# Patient Record
Sex: FEMALE | Race: BLACK OR AFRICAN AMERICAN | NOT HISPANIC OR LATINO | Employment: OTHER | URBAN - METROPOLITAN AREA
[De-identification: names, ages, dates, MRNs, and addresses within clinical notes are randomized per-mention and may not be internally consistent; named-entity substitution may affect disease eponyms.]

---

## 2023-01-28 PROBLEM — M79.89 SUBCUTANEOUS FAT NECROSIS: Status: ACTIVE | Noted: 2023-01-28

## 2023-01-28 PROBLEM — R10.33 UMBILICAL PAIN: Status: ACTIVE | Noted: 2023-01-28

## 2023-01-28 PROBLEM — R79.89 LOW VITAMIN B12 LEVEL: Status: ACTIVE | Noted: 2023-01-28

## 2023-01-28 PROBLEM — K76.89 HEPATIC CYST: Status: ACTIVE | Noted: 2023-01-28

## 2023-01-28 PROBLEM — I51.9 DIASTOLIC DYSFUNCTION, LEFT VENTRICLE: Status: ACTIVE | Noted: 2023-01-28

## 2023-01-28 PROBLEM — K57.30 DIVERTICULOSIS OF COLON: Status: ACTIVE | Noted: 2023-01-28

## 2023-01-28 PROBLEM — K44.9 HIATAL HERNIA: Status: ACTIVE | Noted: 2023-01-28

## 2023-01-28 PROBLEM — D41.4 BLADDER POLYP: Status: ACTIVE | Noted: 2023-01-28

## 2023-01-28 PROBLEM — K80.20 CHOLELITHIASIS: Status: ACTIVE | Noted: 2023-01-28

## 2023-01-28 PROBLEM — G43.909 MIGRAINE WITHOUT STATUS MIGRAINOSUS, NOT INTRACTABLE: Status: ACTIVE | Noted: 2023-01-28

## 2023-01-28 PROBLEM — D64.9 ANEMIA: Status: ACTIVE | Noted: 2023-01-28

## 2023-01-28 PROBLEM — M17.9 KNEE OSTEOARTHRITIS: Status: ACTIVE | Noted: 2023-01-28

## 2023-01-28 PROBLEM — N20.0 KIDNEY STONES: Status: ACTIVE | Noted: 2023-01-28

## 2023-01-28 PROBLEM — N28.1 ACQUIRED CYST OF KIDNEY: Status: ACTIVE | Noted: 2023-01-28

## 2023-01-28 PROBLEM — K21.9 CHRONIC GERD: Status: ACTIVE | Noted: 2023-01-28

## 2023-01-28 PROBLEM — N18.30 CKD (CHRONIC KIDNEY DISEASE) STAGE 3, GFR 30-59 ML/MIN (MULTI): Status: ACTIVE | Noted: 2023-01-28

## 2023-01-28 PROBLEM — E55.9 VITAMIN D DEFICIENCY: Status: ACTIVE | Noted: 2023-01-28

## 2023-01-28 PROBLEM — I10 HYPERTENSION: Status: ACTIVE | Noted: 2023-01-28

## 2023-01-28 PROBLEM — K76.0 FATTY LIVER: Status: ACTIVE | Noted: 2023-01-28

## 2023-01-28 PROBLEM — L43.9 LICHEN PLANUS: Status: ACTIVE | Noted: 2023-01-28

## 2023-01-28 PROBLEM — K64.0 GRADE I HEMORRHOIDS: Status: ACTIVE | Noted: 2023-01-28

## 2023-01-28 PROBLEM — J32.9 SINUSITIS: Status: ACTIVE | Noted: 2023-01-28

## 2023-01-28 PROBLEM — K59.09 CHRONIC CONSTIPATION: Status: ACTIVE | Noted: 2023-01-28

## 2023-01-28 RX ORDER — FLUTICASONE PROPIONATE 50 MCG
2 SPRAY, SUSPENSION (ML) NASAL DAILY
COMMUNITY
Start: 2020-03-06 | End: 2023-08-31 | Stop reason: SDUPTHER

## 2023-01-28 RX ORDER — FERROUS SULFATE 325(65) MG
65 TABLET, DELAYED RELEASE (ENTERIC COATED) ORAL 3 TIMES WEEKLY
COMMUNITY

## 2023-01-28 RX ORDER — LANOLIN ALCOHOL/MO/W.PET/CERES
100 CREAM (GRAM) TOPICAL 3 TIMES WEEKLY
COMMUNITY
Start: 2019-02-22

## 2023-01-28 RX ORDER — CLINDAMYCIN PHOSPHATE 10 MG/G
GEL TOPICAL AS NEEDED
COMMUNITY
End: 2023-11-02 | Stop reason: SDUPTHER

## 2023-01-28 RX ORDER — ERGOCALCIFEROL 1.25 MG/1
1.25 CAPSULE ORAL
COMMUNITY
Start: 2022-01-06 | End: 2023-08-18

## 2023-01-28 RX ORDER — MONTELUKAST SODIUM 10 MG/1
10 TABLET ORAL DAILY
COMMUNITY
End: 2023-03-13 | Stop reason: ALTCHOICE

## 2023-01-28 RX ORDER — SUCRALFATE 1 G/10ML
10 SUSPENSION ORAL 4 TIMES DAILY
COMMUNITY
Start: 2021-04-26 | End: 2023-09-13 | Stop reason: SDUPTHER

## 2023-01-28 RX ORDER — OMEPRAZOLE 20 MG/1
1 CAPSULE, DELAYED RELEASE ORAL DAILY
COMMUNITY
Start: 2020-09-29 | End: 2023-06-28 | Stop reason: SDUPTHER

## 2023-01-28 RX ORDER — VIT C/E/ZN/COPPR/LUTEIN/ZEAXAN 250MG-90MG
25 CAPSULE ORAL EVERY OTHER DAY
COMMUNITY
Start: 2022-07-19

## 2023-01-28 RX ORDER — ACETAMINOPHEN 500 MG
1000 TABLET ORAL EVERY 8 HOURS PRN
COMMUNITY
Start: 2021-11-29

## 2023-01-28 RX ORDER — ALBUTEROL SULFATE 90 UG/1
AEROSOL, METERED RESPIRATORY (INHALATION) AS NEEDED
COMMUNITY
Start: 2019-02-22 | End: 2023-06-28 | Stop reason: SDUPTHER

## 2023-01-28 RX ORDER — DEXTROMETHORPHAN/PSEUDOEPHED 2.5-7.5/.8
80 DROPS ORAL DAILY
COMMUNITY
Start: 2021-04-26 | End: 2024-04-10 | Stop reason: ALTCHOICE

## 2023-01-28 RX ORDER — CETIRIZINE HYDROCHLORIDE 10 MG/1
10 TABLET ORAL NIGHTLY
COMMUNITY
End: 2023-09-13 | Stop reason: SDUPTHER

## 2023-01-28 RX ORDER — SERTRALINE HYDROCHLORIDE 25 MG/1
25 TABLET, FILM COATED ORAL DAILY
COMMUNITY
End: 2023-03-13 | Stop reason: SDDI

## 2023-01-28 RX ORDER — SUMATRIPTAN 50 MG/1
50 TABLET, FILM COATED ORAL ONCE AS NEEDED
COMMUNITY
End: 2024-04-10 | Stop reason: ALTCHOICE

## 2023-01-28 RX ORDER — TEMAZEPAM 7.5 MG/1
7.5 CAPSULE ORAL NIGHTLY PRN
COMMUNITY
End: 2023-03-13 | Stop reason: SDUPTHER

## 2023-01-28 RX ORDER — CANDESARTAN CILEXETIL AND HYDROCHLOROTHIAZIDE 16; 12.5 MG/1; MG/1
1 TABLET ORAL DAILY
COMMUNITY
Start: 2021-03-14 | End: 2023-12-31

## 2023-03-13 ENCOUNTER — OFFICE VISIT (OUTPATIENT)
Dept: PRIMARY CARE | Facility: CLINIC | Age: 73
End: 2023-03-13
Payer: COMMERCIAL

## 2023-03-13 VITALS
SYSTOLIC BLOOD PRESSURE: 112 MMHG | TEMPERATURE: 97.4 F | DIASTOLIC BLOOD PRESSURE: 73 MMHG | HEART RATE: 76 BPM | WEIGHT: 260 LBS | BODY MASS INDEX: 43.94 KG/M2

## 2023-03-13 DIAGNOSIS — F41.9 ANXIETY: ICD-10-CM

## 2023-03-13 DIAGNOSIS — G47.9 SLEEP DISTURBANCE: Primary | ICD-10-CM

## 2023-03-13 DIAGNOSIS — Z79.899 MEDICATION MANAGEMENT: ICD-10-CM

## 2023-03-13 PROBLEM — Z00.00 ROUTINE ADULT HEALTH MAINTENANCE: Status: ACTIVE | Noted: 2023-03-13

## 2023-03-13 PROCEDURE — 1036F TOBACCO NON-USER: CPT | Performed by: NURSE PRACTITIONER

## 2023-03-13 PROCEDURE — 1159F MED LIST DOCD IN RCRD: CPT | Performed by: NURSE PRACTITIONER

## 2023-03-13 PROCEDURE — 3074F SYST BP LT 130 MM HG: CPT | Performed by: NURSE PRACTITIONER

## 2023-03-13 PROCEDURE — 1160F RVW MEDS BY RX/DR IN RCRD: CPT | Performed by: NURSE PRACTITIONER

## 2023-03-13 PROCEDURE — 99214 OFFICE O/P EST MOD 30 MIN: CPT | Performed by: NURSE PRACTITIONER

## 2023-03-13 PROCEDURE — 3078F DIAST BP <80 MM HG: CPT | Performed by: NURSE PRACTITIONER

## 2023-03-13 RX ORDER — TEMAZEPAM 7.5 MG/1
7.5 CAPSULE ORAL NIGHTLY PRN
Qty: 30 CAPSULE | Refills: 3 | Status: SHIPPED | OUTPATIENT
Start: 2023-03-13 | End: 2023-09-13 | Stop reason: SDUPTHER

## 2023-03-13 ASSESSMENT — ANXIETY QUESTIONNAIRES
7. FEELING AFRAID AS IF SOMETHING AWFUL MIGHT HAPPEN: MORE THAN HALF THE DAYS
GAD7 TOTAL SCORE: 10
5. BEING SO RESTLESS THAT IT IS HARD TO SIT STILL: NOT AT ALL
4. TROUBLE RELAXING: MORE THAN HALF THE DAYS
1. FEELING NERVOUS, ANXIOUS, OR ON EDGE: MORE THAN HALF THE DAYS
IF YOU CHECKED OFF ANY PROBLEMS ON THIS QUESTIONNAIRE, HOW DIFFICULT HAVE THESE PROBLEMS MADE IT FOR YOU TO DO YOUR WORK, TAKE CARE OF THINGS AT HOME, OR GET ALONG WITH OTHER PEOPLE: SOMEWHAT DIFFICULT
3. WORRYING TOO MUCH ABOUT DIFFERENT THINGS: MORE THAN HALF THE DAYS
2. NOT BEING ABLE TO STOP OR CONTROL WORRYING: MORE THAN HALF THE DAYS
6. BECOMING EASILY ANNOYED OR IRRITABLE: NOT AT ALL

## 2023-03-13 ASSESSMENT — PATIENT HEALTH QUESTIONNAIRE - PHQ9
1. LITTLE INTEREST OR PLEASURE IN DOING THINGS: NOT AT ALL
SUM OF ALL RESPONSES TO PHQ9 QUESTIONS 1 AND 2: 0
2. FEELING DOWN, DEPRESSED OR HOPELESS: NOT AT ALL

## 2023-03-13 NOTE — PROGRESS NOTES
Subjective   Patient ID: Janell Rivera is a 72 y.o. female who presents for CSV (CSV/Temazepam/).  HPI  Plans to go to counseling  Still having hard time with loss of daughter and the challenges with her granddaughter living with her  Stopped zoloft, only took twice    Review of Systems  ROS completely negative except what was mentioned in the HPI.  Problem List, surgical, social, and family histories which were reviewed and updated as necessary within the EMR. I also personally reviewed the notes, labs, and imaging that pertained to what was documented or discussed in the HPI.      Objective   Physical Exam  Vitals and nursing note reviewed.   Constitutional:       General: She is not in acute distress.     Appearance: Normal appearance.   HENT:      Head: Normocephalic and atraumatic.      Right Ear: External ear normal.      Left Ear: External ear normal.      Nose: Nose normal.      Mouth/Throat:      Mouth: Mucous membranes are moist.   Eyes:      Extraocular Movements: Extraocular movements intact.      Conjunctiva/sclera: Conjunctivae normal.      Pupils: Pupils are equal, round, and reactive to light.   Cardiovascular:      Rate and Rhythm: Normal rate and regular rhythm.      Heart sounds: Normal heart sounds.   Pulmonary:      Effort: Pulmonary effort is normal.      Breath sounds: Normal breath sounds.   Musculoskeletal:         General: Normal range of motion.      Cervical back: Normal range of motion and neck supple.   Lymphadenopathy:      Cervical: No cervical adenopathy.   Skin:     General: Skin is warm and dry.   Neurological:      Mental Status: She is alert and oriented to person, place, and time. Mental status is at baseline.   Psychiatric:         Mood and Affect: Mood normal.         Behavior: Behavior normal.         Thought Content: Thought content normal.         Judgment: Judgment normal.         /73 (BP Location: Left arm)   Pulse 76   Temp 36.3 °C (97.4 °F) (Oral)   Wt 118 kg  (260 lb)   BMI 43.94 kg/m²     Assessment/Plan   Problem List Items Addressed This Visit    None  Visit Diagnoses       Sleep disturbance    -  Primary    Relevant Medications    temazepam (Restoril) 7.5 mg capsule    Other Relevant Orders    Follow Up In [facility] - PCP    Anxiety        Relevant Medications    temazepam (Restoril) 7.5 mg capsule    Other Relevant Orders    Follow Up In [facility] - PCP          OARRS:  Coni Gaming, APRN-CNP on 3/13/2023  1:53 PM  I have personally reviewed the OARRS report for Janell Miguel. I have considered the risks of abuse, dependence, addiction and diversion    Is the patient prescribed a combination of a benzodiazepine and opioid?  Yes, I feel it is clincially indicated to continue the medication and have discussed with the patient risks/benefits/alternatives.    Last Urine Drug Screen / ordered today: Yes  Recent Results (from the past 32290 hour(s))   Drug Screen, Urine With Reflex to Confirmation    Collection Time: 09/30/22 12:30 PM   Result Value Ref Range    DRUG SCREEN COMMENT URINE SEE BELOW     Amphetamine Screen, Urine PRESUMPTIVE NEGATIVE NEGATIVE    Barbiturate Screen, Urine PRESUMPTIVE NEGATIVE NEGATIVE    BENZODIAZEPINE (PRESENCE) IN URINE BY SCREEN METHOD PRESUMPTIVE NEGATIVE NEGATIVE    Cannabinoid Screen, Urine PRESUMPTIVE NEGATIVE NEGATIVE    Cocaine Screen, Urine PRESUMPTIVE NEGATIVE NEGATIVE    Fentanyl, Ur PRESUMPTIVE NEGATIVE NEGATIVE    Methadone Screen, Urine PRESUMPTIVE NEGATIVE NEGATIVE    Opiate Screen, Urine PRESUMPTIVE NEGATIVE NEGATIVE    Oxycodone Screen, Ur PRESUMPTIVE NEGATIVE NEGATIVE    PCP Screen, Urine PRESUMPTIVE NEGATIVE NEGATIVE   OPIATE/OPIOID/BENZO PRESCRIPTION COMPLIANCE    Collection Time: 06/11/21  9:16 AM   Result Value Ref Range    DRUG SCREEN COMMENT URINE SEE BELOW     Creatine, Urine 97.1 mg/dL    Amphetamine Screen, Urine PRESUMPTIVE NEGATIVE NEGATIVE    Barbiturate Screen, Urine PRESUMPTIVE  NEGATIVE NEGATIVE    Cannabinoid Screen, Urine PRESUMPTIVE NEGATIVE NEGATIVE    Cocaine Screen, Urine PRESUMPTIVE NEGATIVE NEGATIVE    PCP Screen, Urine PRESUMPTIVE NEGATIVE NEGATIVE    7-Aminoclonazepam <25 Cutoff <25 ng/mL    Alpha-Hydroxyalprazolam <25 Cutoff <25 ng/mL    Alpha-Hydroxymidazolam <25 Cutoff <25 ng/mL    Alprazolam <25 Cutoff <25 ng/mL    Chlordiazepoxide <25 Cutoff <25 ng/mL    Clonazepam <25 Cutoff <25 ng/mL    Diazepam <25 Cutoff <25 ng/mL    Lorazepam <25 Cutoff <25 ng/mL    Midazolam <25 Cutoff <25 ng/mL    Nordiazepam <25 Cutoff <25 ng/mL    Oxazepam <25 Cutoff <25 ng/mL    Temazepam <25 Cutoff <25 ng/mL    Zolpidem <25 Cutoff <25 ng/mL    Zolpidem Metabolite (ZCA) <25 Cutoff <25 ng/mL    6-Acetylmorphine <25 Cutoff <25 ng/mL    Codeine <50 Cutoff <50 ng/mL    Hydrocodone <25 Cutoff <25 ng/mL    Hydromorphone <25 Cutoff <25 ng/mL    Morphine Urine <50 Cutoff <50 ng/mL    Norhydrocodone <25 Cutoff <25 ng/mL    Noroxycodone <25 Cutoff <25 ng/mL    Oxycodone <25 Cutoff <25 ng/mL    Oxymorphone <25 Cutoff <25 ng/mL    Tramadol <50 Cutoff <50 ng/mL    O-Desmethyltramadol <50 Cutoff <50 ng/mL    Fentanyl <2.5 Cutoff<2.5 ng/mL    Norfentanyl <2.5 Cutoff<2.5 ng/mL    METHADONE CONFIRMATION,URINE <25 Cutoff <25 ng/mL    EDDP <25 Cutoff <25 ng/mL     Results are as expected.     Controlled Substance Agreement:  Date of the Last Agreement: 9/30/2022  Reviewed Controlled Substance Agreement including but not limited to the benefits, risks, and alternatives to treatment with a Controlled Substance medication(s).    Benzodiazepines:  What is the patient's goal of therapy? For sleep and anxiety  Is this being achieved with current treatment? Yes    MILLA-7 Anxiety Scale - see flowsheet    Activities of Daily Living:   Is your overall impression that this patient is benefiting (symptom reduction outweighs side effects) from benzodiazepine therapy? Yes     1. Physical Functioning: Better  2. Family Relationship:  Better  3. Social Relationship: Better  4. Mood: Better  5. Sleep Patterns: Better  6. Overall Function: Better

## 2023-06-12 ENCOUNTER — APPOINTMENT (OUTPATIENT)
Dept: PRIMARY CARE | Facility: CLINIC | Age: 73
End: 2023-06-12
Payer: COMMERCIAL

## 2023-06-28 ENCOUNTER — OFFICE VISIT (OUTPATIENT)
Dept: PRIMARY CARE | Facility: CLINIC | Age: 73
End: 2023-06-28
Payer: COMMERCIAL

## 2023-06-28 VITALS
WEIGHT: 258 LBS | DIASTOLIC BLOOD PRESSURE: 71 MMHG | OXYGEN SATURATION: 95 % | SYSTOLIC BLOOD PRESSURE: 107 MMHG | HEART RATE: 72 BPM | BODY MASS INDEX: 44.05 KG/M2 | TEMPERATURE: 97.9 F | HEIGHT: 64 IN

## 2023-06-28 DIAGNOSIS — J01.11 ACUTE RECURRENT FRONTAL SINUSITIS: ICD-10-CM

## 2023-06-28 DIAGNOSIS — R06.02 SHORTNESS OF BREATH: ICD-10-CM

## 2023-06-28 DIAGNOSIS — G47.9 SLEEP DISTURBANCE: ICD-10-CM

## 2023-06-28 DIAGNOSIS — F41.9 ANXIETY: ICD-10-CM

## 2023-06-28 DIAGNOSIS — K21.9 CHRONIC GERD: Primary | ICD-10-CM

## 2023-06-28 PROCEDURE — 3078F DIAST BP <80 MM HG: CPT | Performed by: NURSE PRACTITIONER

## 2023-06-28 PROCEDURE — 1159F MED LIST DOCD IN RCRD: CPT | Performed by: NURSE PRACTITIONER

## 2023-06-28 PROCEDURE — 3074F SYST BP LT 130 MM HG: CPT | Performed by: NURSE PRACTITIONER

## 2023-06-28 PROCEDURE — 99213 OFFICE O/P EST LOW 20 MIN: CPT | Performed by: NURSE PRACTITIONER

## 2023-06-28 PROCEDURE — 1036F TOBACCO NON-USER: CPT | Performed by: NURSE PRACTITIONER

## 2023-06-28 PROCEDURE — 1160F RVW MEDS BY RX/DR IN RCRD: CPT | Performed by: NURSE PRACTITIONER

## 2023-06-28 RX ORDER — AZITHROMYCIN 250 MG/1
TABLET, FILM COATED ORAL
Qty: 6 TABLET | Refills: 0 | Status: SHIPPED | OUTPATIENT
Start: 2023-06-28 | End: 2023-07-03

## 2023-06-28 RX ORDER — OMEPRAZOLE 20 MG/1
20 CAPSULE, DELAYED RELEASE ORAL
Qty: 90 CAPSULE | Refills: 3 | Status: SHIPPED | OUTPATIENT
Start: 2023-06-28 | End: 2024-04-01 | Stop reason: WASHOUT

## 2023-06-28 RX ORDER — ALBUTEROL SULFATE 90 UG/1
2 AEROSOL, METERED RESPIRATORY (INHALATION) EVERY 4 HOURS PRN
Qty: 18 G | Refills: 11 | Status: SHIPPED | OUTPATIENT
Start: 2023-06-28

## 2023-06-28 NOTE — PATIENT INSTRUCTIONS
Flonase  Max is one spray, each nostril, up to twice a day  When allergies are bad or you get a cold, can start flonase twice a day for a week, then once a day thereafter until symptoms resolve

## 2023-06-28 NOTE — PROGRESS NOTES
Subjective   Patient ID: Janell Rivera is a 72 y.o. female who presents for Sleep follow up.    CSV  Temazepam  Uses sparingly  For sleep  Tolerates well  No AE/SE    Refill Omeprazole 20mg  And Albuterol inhaler  - sometimes gives her racing HR  - resolves shortly after lying down    Sinus and nasal drainage x 1 month  Used to use flonase too much  Now scared to use  Used it once last night  Now sinuses are draining  Some pressure in sinuses  Prone to sinus infections        Review of Systems  ROS completely negative except what was mentioned in the HPI.  Problem List, surgical, social, and family histories which were reviewed and updated as necessary within the EMR. I also personally reviewed the notes, labs, and imaging that pertained to what was documented or discussed in the HPI.      Objective   Physical Exam  Vitals and nursing note reviewed.   Constitutional:       General: She is not in acute distress.     Appearance: Normal appearance.   HENT:      Head: Normocephalic and atraumatic.      Right Ear: Tympanic membrane, ear canal and external ear normal.      Left Ear: Tympanic membrane, ear canal and external ear normal.      Nose: Nose normal.      Mouth/Throat:      Mouth: Mucous membranes are moist.   Eyes:      Extraocular Movements: Extraocular movements intact.      Conjunctiva/sclera: Conjunctivae normal.      Pupils: Pupils are equal, round, and reactive to light.   Cardiovascular:      Rate and Rhythm: Normal rate and regular rhythm.      Heart sounds: Normal heart sounds.   Pulmonary:      Effort: Pulmonary effort is normal.      Breath sounds: Normal breath sounds.   Musculoskeletal:         General: Normal range of motion.      Cervical back: Normal range of motion and neck supple.   Skin:     General: Skin is warm and dry.   Neurological:      General: No focal deficit present.      Mental Status: She is alert and oriented to person, place, and time. Mental status is at baseline.   Psychiatric:   "       Mood and Affect: Mood normal.         Behavior: Behavior normal.         Thought Content: Thought content normal.         Judgment: Judgment normal.         /71 (BP Location: Left arm)   Pulse 72   Temp 36.6 °C (97.9 °F) (Temporal)   Ht 1.626 m (5' 4\")   Wt 117 kg (258 lb)   SpO2 95%   BMI 44.29 kg/m²     Assessment/Plan   Problem List Items Addressed This Visit       Anxiety     Uses restoril for sleep and anxiety  No AE/SE  Tolerates well  Monitor         Chronic GERD - Primary    Relevant Medications    omeprazole (PriLOSEC) 20 mg DR capsule    Sinusitis     Dicussed proper usage of flonase  Azith if not improving         Relevant Medications    azithromycin (Zithromax) 250 mg tablet    Sleep disturbance     Uses restoril at night prn  To help with anxiety and sleep disturbance  No AE/SE  Monitor          Other Visit Diagnoses       Shortness of breath        Relevant Medications    albuterol 90 mcg/actuation inhaler          OARRS:  Coni Gaming, APRN-CNP on 6/28/2023  3:56 PM  I have personally reviewed the OARRS report for Janell Rivera. I have considered the risks of abuse, dependence, addiction and diversion and I believe that it is clinically appropriate for Janell Rivera to be prescribed this medication    Is the patient prescribed a combination of a benzodiazepine and opioid?  No    Last Urine Drug Screen / ordered today: Yes  Recent Results (from the past 81970 hour(s))   Drug Screen, Urine With Reflex to Confirmation    Collection Time: 09/30/22 12:30 PM   Result Value Ref Range    DRUG SCREEN COMMENT URINE SEE BELOW     Amphetamine Screen, Urine PRESUMPTIVE NEGATIVE NEGATIVE    Barbiturate Screen, Urine PRESUMPTIVE NEGATIVE NEGATIVE    BENZODIAZEPINE (PRESENCE) IN URINE BY SCREEN METHOD PRESUMPTIVE NEGATIVE NEGATIVE    Cannabinoid Screen, Urine PRESUMPTIVE NEGATIVE NEGATIVE    Cocaine Screen, Urine PRESUMPTIVE NEGATIVE NEGATIVE    Fentanyl, Ur PRESUMPTIVE NEGATIVE NEGATIVE    " Methadone Screen, Urine PRESUMPTIVE NEGATIVE NEGATIVE    Opiate Screen, Urine PRESUMPTIVE NEGATIVE NEGATIVE    Oxycodone Screen, Ur PRESUMPTIVE NEGATIVE NEGATIVE    PCP Screen, Urine PRESUMPTIVE NEGATIVE NEGATIVE   OPIATE/OPIOID/BENZO PRESCRIPTION COMPLIANCE    Collection Time: 06/11/21  9:16 AM   Result Value Ref Range    DRUG SCREEN COMMENT URINE SEE BELOW     Creatine, Urine 97.1 mg/dL    Amphetamine Screen, Urine PRESUMPTIVE NEGATIVE NEGATIVE    Barbiturate Screen, Urine PRESUMPTIVE NEGATIVE NEGATIVE    Cannabinoid Screen, Urine PRESUMPTIVE NEGATIVE NEGATIVE    Cocaine Screen, Urine PRESUMPTIVE NEGATIVE NEGATIVE    PCP Screen, Urine PRESUMPTIVE NEGATIVE NEGATIVE    7-Aminoclonazepam <25 Cutoff <25 ng/mL    Alpha-Hydroxyalprazolam <25 Cutoff <25 ng/mL    Alpha-Hydroxymidazolam <25 Cutoff <25 ng/mL    Alprazolam <25 Cutoff <25 ng/mL    Chlordiazepoxide <25 Cutoff <25 ng/mL    Clonazepam <25 Cutoff <25 ng/mL    Diazepam <25 Cutoff <25 ng/mL    Lorazepam <25 Cutoff <25 ng/mL    Midazolam <25 Cutoff <25 ng/mL    Nordiazepam <25 Cutoff <25 ng/mL    Oxazepam <25 Cutoff <25 ng/mL    Temazepam <25 Cutoff <25 ng/mL    Zolpidem <25 Cutoff <25 ng/mL    Zolpidem Metabolite (ZCA) <25 Cutoff <25 ng/mL    6-Acetylmorphine <25 Cutoff <25 ng/mL    Codeine <50 Cutoff <50 ng/mL    Hydrocodone <25 Cutoff <25 ng/mL    Hydromorphone <25 Cutoff <25 ng/mL    Morphine Urine <50 Cutoff <50 ng/mL    Norhydrocodone <25 Cutoff <25 ng/mL    Noroxycodone <25 Cutoff <25 ng/mL    Oxycodone <25 Cutoff <25 ng/mL    Oxymorphone <25 Cutoff <25 ng/mL    Tramadol <50 Cutoff <50 ng/mL    O-Desmethyltramadol <50 Cutoff <50 ng/mL    Fentanyl <2.5 Cutoff<2.5 ng/mL    Norfentanyl <2.5 Cutoff<2.5 ng/mL    METHADONE CONFIRMATION,URINE <25 Cutoff <25 ng/mL    EDDP <25 Cutoff <25 ng/mL     Results are as expected.     Controlled Substance Agreement:  Date of the Last Agreement: 9/30/22  Reviewed Controlled Substance Agreement including but not limited to the  benefits, risks, and alternatives to treatment with a Controlled Substance medication(s).    Benzodiazepines:  What is the patient's goal of therapy? For sleep  Is this being achieved with current treatment? yes      Activities of Daily Living:   Is your overall impression that this patient is benefiting (symptom reduction outweighs side effects) from benzodiazepine therapy? Yes     1. Physical Functioning: Same  2. Family Relationship: Same  3. Social Relationship: Same  4. Mood: Same  5. Sleep Patterns: Better  6. Overall Function: Better

## 2023-07-25 LAB
BASOPHILS (10*3/UL) IN BLOOD BY AUTOMATED COUNT: 0.04 X10E9/L (ref 0–0.1)
BASOPHILS/100 LEUKOCYTES IN BLOOD BY AUTOMATED COUNT: 0.7 % (ref 0–2)
EOSINOPHILS (10*3/UL) IN BLOOD BY AUTOMATED COUNT: 0.27 X10E9/L (ref 0–0.4)
EOSINOPHILS/100 LEUKOCYTES IN BLOOD BY AUTOMATED COUNT: 5.1 % (ref 0–6)
ERYTHROCYTE DISTRIBUTION WIDTH (RATIO) BY AUTOMATED COUNT: 13.1 % (ref 11.5–14.5)
ERYTHROCYTE MEAN CORPUSCULAR HEMOGLOBIN CONCENTRATION (G/DL) BY AUTOMATED: 33 G/DL (ref 32–36)
ERYTHROCYTE MEAN CORPUSCULAR VOLUME (FL) BY AUTOMATED COUNT: 87 FL (ref 80–100)
ERYTHROCYTES (10*6/UL) IN BLOOD BY AUTOMATED COUNT: 4.22 X10E12/L (ref 4–5.2)
HEMATOCRIT (%) IN BLOOD BY AUTOMATED COUNT: 36.7 % (ref 36–46)
HEMOGLOBIN (G/DL) IN BLOOD: 12.1 G/DL (ref 12–16)
IMMATURE GRANULOCYTES/100 LEUKOCYTES IN BLOOD BY AUTOMATED COUNT: 0.2 % (ref 0–0.9)
LEUKOCYTES (10*3/UL) IN BLOOD BY AUTOMATED COUNT: 5.3 X10E9/L (ref 4.4–11.3)
LYMPHOCYTES (10*3/UL) IN BLOOD BY AUTOMATED COUNT: 2.03 X10E9/L (ref 0.8–3)
LYMPHOCYTES/100 LEUKOCYTES IN BLOOD BY AUTOMATED COUNT: 38 % (ref 13–44)
MONOCYTES (10*3/UL) IN BLOOD BY AUTOMATED COUNT: 0.36 X10E9/L (ref 0.05–0.8)
MONOCYTES/100 LEUKOCYTES IN BLOOD BY AUTOMATED COUNT: 6.7 % (ref 2–10)
NEUTROPHILS (10*3/UL) IN BLOOD BY AUTOMATED COUNT: 2.63 X10E9/L (ref 1.6–5.5)
NEUTROPHILS/100 LEUKOCYTES IN BLOOD BY AUTOMATED COUNT: 49.3 % (ref 40–80)
PLATELETS (10*3/UL) IN BLOOD AUTOMATED COUNT: 275 X10E9/L (ref 150–450)

## 2023-07-26 LAB
ANION GAP IN SER/PLAS: 12 MMOL/L (ref 10–20)
CALCIDIOL (25 OH VITAMIN D3) (NG/ML) IN SER/PLAS: 36 NG/ML
CALCIUM (MG/DL) IN SER/PLAS: 9.5 MG/DL (ref 8.6–10.3)
CARBON DIOXIDE, TOTAL (MMOL/L) IN SER/PLAS: 28 MMOL/L (ref 21–32)
CHLORIDE (MMOL/L) IN SER/PLAS: 103 MMOL/L (ref 98–107)
CREATININE (MG/DL) IN SER/PLAS: 1.24 MG/DL (ref 0.5–1.05)
GFR FEMALE: 46 ML/MIN/1.73M2
GLUCOSE (MG/DL) IN SER/PLAS: 99 MG/DL (ref 74–99)
PARATHYRIN INTACT (PG/ML) IN SER/PLAS: 60 PG/ML (ref 18.5–88)
PHOSPHATE (MG/DL) IN SER/PLAS: 3.6 MG/DL (ref 2.5–4.9)
POTASSIUM (MMOL/L) IN SER/PLAS: 3.8 MMOL/L (ref 3.5–5.3)
SODIUM (MMOL/L) IN SER/PLAS: 139 MMOL/L (ref 136–145)
UREA NITROGEN (MG/DL) IN SER/PLAS: 18 MG/DL (ref 6–23)

## 2023-08-18 DIAGNOSIS — E55.9 VITAMIN D DEFICIENCY, UNSPECIFIED: ICD-10-CM

## 2023-08-18 PROBLEM — B02.9 HERPES ZOSTER WITHOUT COMPLICATION: Status: ACTIVE | Noted: 2023-08-18

## 2023-08-18 PROBLEM — E83.41 HYPERMAGNESEMIA: Status: ACTIVE | Noted: 2023-08-18

## 2023-08-18 PROBLEM — Z86.19 HISTORY OF SHINGLES: Status: ACTIVE | Noted: 2023-08-18

## 2023-08-18 PROBLEM — F41.9 ANXIETY AND DEPRESSION: Status: ACTIVE | Noted: 2023-08-18

## 2023-08-18 PROBLEM — S92.501A FRACTURE OF FIFTH TOE, RIGHT, CLOSED, INITIAL ENCOUNTER: Status: ACTIVE | Noted: 2017-09-22

## 2023-08-18 PROBLEM — H52.4 HYPEROPIA OF BOTH EYES WITH ASTIGMATISM AND PRESBYOPIA: Status: ACTIVE | Noted: 2019-08-30

## 2023-08-18 PROBLEM — H25.813 COMBINED FORMS OF AGE-RELATED CATARACT OF BOTH EYES: Status: ACTIVE | Noted: 2019-08-30

## 2023-08-18 PROBLEM — F32.A ANXIETY AND DEPRESSION: Status: ACTIVE | Noted: 2023-08-18

## 2023-08-18 PROBLEM — Z98.890 HISTORY OF COLONOSCOPY: Status: ACTIVE | Noted: 2023-08-18

## 2023-08-18 PROBLEM — F51.04 CHRONIC INSOMNIA: Status: ACTIVE | Noted: 2023-08-18

## 2023-08-18 PROBLEM — E66.09 OBESITY DUE TO EXCESS CALORIES: Status: ACTIVE | Noted: 2017-04-24

## 2023-08-18 PROBLEM — H52.03 HYPEROPIA OF BOTH EYES WITH ASTIGMATISM AND PRESBYOPIA: Status: ACTIVE | Noted: 2019-08-30

## 2023-08-18 PROBLEM — H10.9 CONJUNCTIVITIS: Status: ACTIVE | Noted: 2019-08-30

## 2023-08-18 PROBLEM — H52.203 HYPEROPIA OF BOTH EYES WITH ASTIGMATISM AND PRESBYOPIA: Status: ACTIVE | Noted: 2019-08-30

## 2023-08-18 PROBLEM — R73.01 IFG (IMPAIRED FASTING GLUCOSE): Status: ACTIVE | Noted: 2023-08-18

## 2023-08-18 PROBLEM — N28.9 IMPAIRED RENAL FUNCTION: Status: ACTIVE | Noted: 2023-08-18

## 2023-08-18 PROBLEM — M79.671 PAIN IN RIGHT FOOT: Status: ACTIVE | Noted: 2017-09-22

## 2023-08-18 PROBLEM — L30.9 ECZEMA: Status: ACTIVE | Noted: 2023-08-18

## 2023-08-18 RX ORDER — ERGOCALCIFEROL 1.25 MG/1
CAPSULE ORAL
Qty: 12 CAPSULE | Refills: 3 | Status: SHIPPED | OUTPATIENT
Start: 2023-08-18 | End: 2024-04-01 | Stop reason: WASHOUT

## 2023-08-31 ENCOUNTER — OFFICE VISIT (OUTPATIENT)
Dept: PRIMARY CARE | Facility: CLINIC | Age: 73
End: 2023-08-31
Payer: COMMERCIAL

## 2023-08-31 VITALS
TEMPERATURE: 97.9 F | HEART RATE: 77 BPM | HEIGHT: 64 IN | SYSTOLIC BLOOD PRESSURE: 92 MMHG | BODY MASS INDEX: 44.05 KG/M2 | OXYGEN SATURATION: 98 % | DIASTOLIC BLOOD PRESSURE: 63 MMHG | WEIGHT: 258 LBS

## 2023-08-31 DIAGNOSIS — H66.91 ACUTE OTITIS MEDIA, RIGHT: Primary | ICD-10-CM

## 2023-08-31 LAB — POC RAPID STREP: NEGATIVE

## 2023-08-31 PROCEDURE — 99213 OFFICE O/P EST LOW 20 MIN: CPT | Performed by: NURSE PRACTITIONER

## 2023-08-31 PROCEDURE — 1036F TOBACCO NON-USER: CPT | Performed by: NURSE PRACTITIONER

## 2023-08-31 PROCEDURE — 1159F MED LIST DOCD IN RCRD: CPT | Performed by: NURSE PRACTITIONER

## 2023-08-31 PROCEDURE — 3078F DIAST BP <80 MM HG: CPT | Performed by: NURSE PRACTITIONER

## 2023-08-31 PROCEDURE — 87880 STREP A ASSAY W/OPTIC: CPT | Performed by: NURSE PRACTITIONER

## 2023-08-31 PROCEDURE — 87635 SARS-COV-2 COVID-19 AMP PRB: CPT

## 2023-08-31 PROCEDURE — 3074F SYST BP LT 130 MM HG: CPT | Performed by: NURSE PRACTITIONER

## 2023-08-31 PROCEDURE — 1160F RVW MEDS BY RX/DR IN RCRD: CPT | Performed by: NURSE PRACTITIONER

## 2023-08-31 RX ORDER — AZITHROMYCIN 250 MG/1
TABLET, FILM COATED ORAL
Qty: 6 TABLET | Refills: 0 | Status: SHIPPED | OUTPATIENT
Start: 2023-08-31 | End: 2023-09-01 | Stop reason: SDUPTHER

## 2023-08-31 RX ORDER — FLUTICASONE PROPIONATE 50 MCG
2 SPRAY, SUSPENSION (ML) NASAL DAILY
Qty: 16 G | Refills: 3 | Status: SHIPPED | OUTPATIENT
Start: 2023-08-31 | End: 2023-09-24

## 2023-08-31 ASSESSMENT — PATIENT HEALTH QUESTIONNAIRE - PHQ9
2. FEELING DOWN, DEPRESSED OR HOPELESS: NOT AT ALL
1. LITTLE INTEREST OR PLEASURE IN DOING THINGS: NOT AT ALL
SUM OF ALL RESPONSES TO PHQ9 QUESTIONS 1 AND 2: 0

## 2023-08-31 NOTE — PROGRESS NOTES
"Problem List Items Addressed This Visit    None  Visit Diagnoses       Acute otitis media, right    -  Primary    IO strep neg  covid pcr sent (sx x week now)  R OM azith (good response previous)  fu prn    Relevant Medications    fluticasone (Flonase) 50 mcg/actuation nasal spray    azithromycin (Zithromax) 250 mg tablet    Other Relevant Orders    Sars-CoV-2 PCR, Symptomatic    POCT Rapid Strep A manually resulted (Completed)             Subjective   Patient ID: Janell Rivera is a 72 y.o. 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      BP Readings from Last 3 Encounters:   08/31/23 92/63   06/28/23 107/71   03/13/23 112/73      Wt Readings from Last 3 Encounters:   08/31/23 117 kg (258 lb)   06/28/23 117 kg (258 lb)   03/13/23 118 kg (260 lb)      BMI:   Estimated body mass index is 44.29 kg/m² as calculated from the following:    Height as of this encounter: 1.626 m (5' 4\").    Weight as of this encounter: 117 kg (258 lb).    Objective   Physical Exam  Constitutional:       General: She is not in acute distress.  HENT:      Head: Normocephalic and atraumatic.      Right Ear: External ear normal.      Left Ear: Tympanic membrane and external ear normal.      Ears:      Comments: R OM      Nose: Nose normal.      Mouth/Throat:      Mouth: Mucous membranes are moist.   Eyes:      Extraocular Movements: Extraocular movements intact.      Conjunctiva/sclera: Conjunctivae normal.   Cardiovascular:      Rate and Rhythm: Normal rate and regular rhythm.      Pulses: Normal pulses.   Pulmonary:      Effort: Pulmonary effort is " normal.      Breath sounds: Normal breath sounds.   Musculoskeletal:         General: Normal range of motion.      Cervical back: Normal range of motion and neck supple.   Skin:     General: Skin is warm and dry.   Neurological:      General: No focal deficit present.      Mental Status: She is alert.   Psychiatric:         Mood and Affect: Mood normal.

## 2023-08-31 NOTE — LETTER
Janell Rivera  1950  Juror Number: [id number]    8/31/2023    To Whom This May Concern:    My patient, Janell Rivera, is unable to perform Jury Duty due to a mental or physical condition that renders the prospective juror unfit for jury service for the remainder of the jury year.    Should you have any questions please do not hesitate to call.    Thank you for your cooperation.    Sincerely,    Hellen Claros, JAKOB-CNP

## 2023-09-01 DIAGNOSIS — H66.91 ACUTE OTITIS MEDIA, RIGHT: ICD-10-CM

## 2023-09-01 LAB — SARS-COV-2 RESULT: NOT DETECTED

## 2023-09-01 RX ORDER — AZITHROMYCIN 250 MG/1
TABLET, FILM COATED ORAL
Qty: 6 TABLET | Refills: 0 | Status: SHIPPED | OUTPATIENT
Start: 2023-09-01 | End: 2023-09-13 | Stop reason: ALTCHOICE

## 2023-09-01 RX ORDER — AZITHROMYCIN 250 MG/1
TABLET, FILM COATED ORAL
Qty: 6 TABLET | Refills: 0 | Status: SHIPPED | OUTPATIENT
Start: 2023-09-01 | End: 2023-09-01 | Stop reason: ALTCHOICE

## 2023-09-13 ENCOUNTER — OFFICE VISIT (OUTPATIENT)
Dept: PRIMARY CARE | Facility: CLINIC | Age: 73
End: 2023-09-13
Payer: COMMERCIAL

## 2023-09-13 VITALS
BODY MASS INDEX: 44.56 KG/M2 | TEMPERATURE: 97.8 F | HEART RATE: 74 BPM | WEIGHT: 259.6 LBS | DIASTOLIC BLOOD PRESSURE: 71 MMHG | SYSTOLIC BLOOD PRESSURE: 108 MMHG | OXYGEN SATURATION: 96 %

## 2023-09-13 DIAGNOSIS — K21.9 CHRONIC GERD: ICD-10-CM

## 2023-09-13 DIAGNOSIS — J30.2 SEASONAL ALLERGIES: ICD-10-CM

## 2023-09-13 DIAGNOSIS — G47.9 SLEEP DISTURBANCE: ICD-10-CM

## 2023-09-13 DIAGNOSIS — H66.91 ACUTE OTITIS MEDIA, RIGHT: Primary | ICD-10-CM

## 2023-09-13 DIAGNOSIS — F41.9 ANXIETY: ICD-10-CM

## 2023-09-13 PROCEDURE — 1160F RVW MEDS BY RX/DR IN RCRD: CPT | Performed by: NURSE PRACTITIONER

## 2023-09-13 PROCEDURE — 1159F MED LIST DOCD IN RCRD: CPT | Performed by: NURSE PRACTITIONER

## 2023-09-13 PROCEDURE — 3074F SYST BP LT 130 MM HG: CPT | Performed by: NURSE PRACTITIONER

## 2023-09-13 PROCEDURE — 1036F TOBACCO NON-USER: CPT | Performed by: NURSE PRACTITIONER

## 2023-09-13 PROCEDURE — 99213 OFFICE O/P EST LOW 20 MIN: CPT | Performed by: NURSE PRACTITIONER

## 2023-09-13 PROCEDURE — 3078F DIAST BP <80 MM HG: CPT | Performed by: NURSE PRACTITIONER

## 2023-09-13 RX ORDER — CETIRIZINE HYDROCHLORIDE 10 MG/1
10 TABLET ORAL NIGHTLY
Qty: 30 TABLET | Refills: 3 | Status: SHIPPED | OUTPATIENT
Start: 2023-09-13 | End: 2024-01-09

## 2023-09-13 RX ORDER — SUCRALFATE 1 G/10ML
1 SUSPENSION ORAL 2 TIMES DAILY
Qty: 420 ML | Refills: 1 | Status: SHIPPED | OUTPATIENT
Start: 2023-09-13 | End: 2023-10-02

## 2023-09-13 RX ORDER — TEMAZEPAM 7.5 MG/1
7.5 CAPSULE ORAL NIGHTLY PRN
Qty: 30 CAPSULE | Refills: 0 | Status: SHIPPED | OUTPATIENT
Start: 2023-09-13 | End: 2023-10-02

## 2023-09-13 RX ORDER — SUCRALFATE 1 G/10ML
1 SUSPENSION ORAL 2 TIMES DAILY
Qty: 414 ML | Refills: 1 | Status: SHIPPED | OUTPATIENT
Start: 2023-09-13 | End: 2023-09-13

## 2023-09-13 ASSESSMENT — PATIENT HEALTH QUESTIONNAIRE - PHQ9
10. IF YOU CHECKED OFF ANY PROBLEMS, HOW DIFFICULT HAVE THESE PROBLEMS MADE IT FOR YOU TO DO YOUR WORK, TAKE CARE OF THINGS AT HOME, OR GET ALONG WITH OTHER PEOPLE: NOT DIFFICULT AT ALL
2. FEELING DOWN, DEPRESSED OR HOPELESS: SEVERAL DAYS
1. LITTLE INTEREST OR PLEASURE IN DOING THINGS: SEVERAL DAYS
SUM OF ALL RESPONSES TO PHQ9 QUESTIONS 1 AND 2: 2

## 2023-09-13 NOTE — PROGRESS NOTES
"Problem List Items Addressed This Visit       Anxiety    Overview     Uses restoril for sleep and anxiety  No AE/SE  Tolerates well  Monitor         Relevant Medications    temazepam (Restoril) 7.5 mg capsule    Chronic GERD    Overview     Uses carafate prn          Relevant Medications    sucralfate (Carafate) 100 mg/mL suspension    Seasonal allergies    Overview     Uses zyrtec and flonase prn          Relevant Medications    cetirizine (ZyrTEC) 10 mg tablet    Sleep disturbance    Overview     Uses restoril at night prn  To help with anxiety and sleep disturbance  No AE/SE  Monitor         Relevant Medications    temazepam (Restoril) 7.5 mg capsule     Other Visit Diagnoses       Acute otitis media, right    -  Primary    resolved with azith  fu prn             Subjective   Patient ID: Janell Rivera is a 72 y.o. female who presents for Follow-up (Follow up appt for right ear to make sure it is clear).  HPI  I saw her 8/31/23   OM  Azith   improved    Review of Systems   All other systems reviewed and are negative.      BP Readings from Last 3 Encounters:   09/13/23 108/71   08/31/23 92/63   06/28/23 107/71      Wt Readings from Last 3 Encounters:   09/13/23 118 kg (259 lb 9.6 oz)   08/31/23 117 kg (258 lb)   06/28/23 117 kg (258 lb)      BMI:   Estimated body mass index is 44.56 kg/m² as calculated from the following:    Height as of 8/31/23: 1.626 m (5' 4\").    Weight as of this encounter: 118 kg (259 lb 9.6 oz).    Objective   Physical Exam  Constitutional:       Appearance: Normal appearance.   HENT:      Head: Normocephalic and atraumatic.      Right Ear: Tympanic membrane, ear canal and external ear normal.      Left Ear: Tympanic membrane, ear canal and external ear normal.      Nose: Nose normal.      Mouth/Throat:      Mouth: Mucous membranes are moist.      Pharynx: Oropharynx is clear.   Eyes:      Conjunctiva/sclera: Conjunctivae normal.   Pulmonary:      Effort: Pulmonary effort is normal. "   Neurological:      General: No focal deficit present.      Mental Status: She is alert.   Psychiatric:         Mood and Affect: Mood normal.

## 2023-09-18 ENCOUNTER — TELEPHONE (OUTPATIENT)
Dept: PRIMARY CARE | Facility: CLINIC | Age: 73
End: 2023-09-18
Payer: COMMERCIAL

## 2023-09-18 NOTE — TELEPHONE ENCOUNTER
Spoke with pt let her know insurance denied [place] for her Temazepam 7.5 mg  Let her know cost of med if you use good rx is 38 40 dollars at [facility] or [facility]  Pt states she sees NP soon will think about it and let us know which pharmacy to send it to

## 2023-09-20 PROBLEM — Z71.89 ADVANCE DIRECTIVE DISCUSSED WITH PATIENT: Status: ACTIVE | Noted: 2023-09-20

## 2023-09-20 PROBLEM — Z71.89 CARDIAC RISK COUNSELING: Status: ACTIVE | Noted: 2023-09-20

## 2023-09-20 PROBLEM — Z13.89 SCREENING FOR MULTIPLE CONDITIONS: Status: ACTIVE | Noted: 2023-09-20

## 2023-09-20 PROBLEM — R93.1 ABNORMAL SCREENING CARDIAC CT: Status: ACTIVE | Noted: 2023-09-20

## 2023-09-20 PROBLEM — Z78.0 MENOPAUSE: Status: ACTIVE | Noted: 2023-09-20

## 2023-09-23 DIAGNOSIS — H66.91 ACUTE OTITIS MEDIA, RIGHT: ICD-10-CM

## 2023-09-24 RX ORDER — FLUTICASONE PROPIONATE 50 MCG
2 SPRAY, SUSPENSION (ML) NASAL DAILY
Qty: 16 ML | Refills: 3 | Status: SHIPPED | OUTPATIENT
Start: 2023-09-24 | End: 2023-12-28

## 2023-10-02 ENCOUNTER — OFFICE VISIT (OUTPATIENT)
Dept: PRIMARY CARE | Facility: CLINIC | Age: 73
End: 2023-10-02
Payer: COMMERCIAL

## 2023-10-02 VITALS
OXYGEN SATURATION: 96 % | HEART RATE: 87 BPM | TEMPERATURE: 97.7 F | WEIGHT: 257 LBS | HEIGHT: 64 IN | BODY MASS INDEX: 43.87 KG/M2 | SYSTOLIC BLOOD PRESSURE: 119 MMHG | DIASTOLIC BLOOD PRESSURE: 77 MMHG

## 2023-10-02 DIAGNOSIS — Z51.81 ENCOUNTER FOR THERAPEUTIC DRUG LEVEL MONITORING: ICD-10-CM

## 2023-10-02 DIAGNOSIS — N18.31 STAGE 3A CHRONIC KIDNEY DISEASE (MULTI): ICD-10-CM

## 2023-10-02 DIAGNOSIS — Z00.00 ROUTINE ADULT HEALTH MAINTENANCE: ICD-10-CM

## 2023-10-02 DIAGNOSIS — S51.011A SKIN TEAR OF RIGHT ELBOW WITHOUT COMPLICATION, INITIAL ENCOUNTER: ICD-10-CM

## 2023-10-02 DIAGNOSIS — G47.9 SLEEP DISTURBANCE: ICD-10-CM

## 2023-10-02 DIAGNOSIS — J01.11 ACUTE RECURRENT FRONTAL SINUSITIS: ICD-10-CM

## 2023-10-02 DIAGNOSIS — K21.9 CHRONIC GERD: ICD-10-CM

## 2023-10-02 DIAGNOSIS — F41.9 ANXIETY: Primary | ICD-10-CM

## 2023-10-02 DIAGNOSIS — H60.501 ACUTE OTITIS EXTERNA OF RIGHT EAR, UNSPECIFIED TYPE: ICD-10-CM

## 2023-10-02 DIAGNOSIS — F32.1 MAJOR DEPRESSIVE DISORDER, SINGLE EPISODE, MODERATE (MULTI): ICD-10-CM

## 2023-10-02 PROCEDURE — 1159F MED LIST DOCD IN RCRD: CPT | Performed by: NURSE PRACTITIONER

## 2023-10-02 PROCEDURE — 99214 OFFICE O/P EST MOD 30 MIN: CPT | Performed by: NURSE PRACTITIONER

## 2023-10-02 PROCEDURE — 1036F TOBACCO NON-USER: CPT | Performed by: NURSE PRACTITIONER

## 2023-10-02 PROCEDURE — 1160F RVW MEDS BY RX/DR IN RCRD: CPT | Performed by: NURSE PRACTITIONER

## 2023-10-02 PROCEDURE — 3078F DIAST BP <80 MM HG: CPT | Performed by: NURSE PRACTITIONER

## 2023-10-02 PROCEDURE — 3074F SYST BP LT 130 MM HG: CPT | Performed by: NURSE PRACTITIONER

## 2023-10-02 RX ORDER — NEOMYCIN SULFATE, POLYMYXIN B SULFATE, HYDROCORTISONE 3.5; 10000; 1 MG/ML; [USP'U]/ML; MG/ML
SOLUTION/ DROPS AURICULAR (OTIC)
Qty: 10 ML | Refills: 1 | Status: SHIPPED | OUTPATIENT
Start: 2023-10-02 | End: 2024-01-02 | Stop reason: WASHOUT

## 2023-10-02 RX ORDER — SILVER SULFADIAZINE 10 G/1000G
CREAM TOPICAL
Qty: 20 G | Refills: 0 | Status: SHIPPED | OUTPATIENT
Start: 2023-10-02 | End: 2023-12-01

## 2023-10-02 RX ORDER — AZITHROMYCIN 250 MG/1
TABLET, FILM COATED ORAL
Qty: 6 TABLET | Refills: 0 | Status: SHIPPED | OUTPATIENT
Start: 2023-10-02 | End: 2023-10-07

## 2023-10-02 RX ORDER — ALPRAZOLAM 0.25 MG/1
0.25 TABLET ORAL NIGHTLY PRN
Qty: 30 TABLET | Refills: 0 | Status: SHIPPED | OUTPATIENT
Start: 2023-10-02 | End: 2024-01-09 | Stop reason: WASHOUT

## 2023-10-02 RX ORDER — SUCRALFATE 1 G/1
1 TABLET ORAL
Qty: 120 TABLET | Refills: 11 | Status: SHIPPED | OUTPATIENT
Start: 2023-10-02 | End: 2023-10-24

## 2023-10-02 ASSESSMENT — ANXIETY QUESTIONNAIRES
2. NOT BEING ABLE TO STOP OR CONTROL WORRYING: MORE THAN HALF THE DAYS
IF YOU CHECKED OFF ANY PROBLEMS ON THIS QUESTIONNAIRE, HOW DIFFICULT HAVE THESE PROBLEMS MADE IT FOR YOU TO DO YOUR WORK, TAKE CARE OF THINGS AT HOME, OR GET ALONG WITH OTHER PEOPLE: SOMEWHAT DIFFICULT
1. FEELING NERVOUS, ANXIOUS, OR ON EDGE: SEVERAL DAYS
GAD7 TOTAL SCORE: 9
3. WORRYING TOO MUCH ABOUT DIFFERENT THINGS: NEARLY EVERY DAY
7. FEELING AFRAID AS IF SOMETHING AWFUL MIGHT HAPPEN: NEARLY EVERY DAY
6. BECOMING EASILY ANNOYED OR IRRITABLE: NOT AT ALL
4. TROUBLE RELAXING: NOT AT ALL
5. BEING SO RESTLESS THAT IT IS HARD TO SIT STILL: NOT AT ALL

## 2023-10-02 NOTE — PROGRESS NOTES
Subjective   Patient ID: Janell Rivera is a 72 y.o. female who presents for CSV (Temazpam/Recent fall x 1 week right elbow area/).    Insurance wont cover temazepam anymore  Lorazepam caused HA  Tolerates well  Uses sparingly  For anxiety and sleep    Insurance wont cover liquid carafate  Needs pill form    Feel coming out of a house  Dimly lit area  Thought there was a step down  Fell and scraped elbow  Mild soreness    Soreness in right ear  On movement        Review of Systems  ROS completely negative except what was mentioned in the HPI.  Problem List, surgical, social, and family histories which were reviewed and updated as necessary within the EMR. I also personally reviewed the notes, labs, and imaging that pertained to what was documented or discussed in the HPI.    Objective   Physical Exam  Vitals and nursing note reviewed.   Constitutional:       General: She is not in acute distress.     Appearance: Normal appearance.   HENT:      Head: Normocephalic and atraumatic.      Right Ear: External ear normal.      Left Ear: External ear normal.      Nose: Nose normal.      Mouth/Throat:      Mouth: Mucous membranes are moist.   Eyes:      Extraocular Movements: Extraocular movements intact.      Conjunctiva/sclera: Conjunctivae normal.      Pupils: Pupils are equal, round, and reactive to light.   Cardiovascular:      Rate and Rhythm: Normal rate and regular rhythm.      Heart sounds: Normal heart sounds.   Pulmonary:      Effort: Pulmonary effort is normal.      Breath sounds: Normal breath sounds.   Musculoskeletal:         General: Normal range of motion.      Cervical back: Normal range of motion and neck supple.   Skin:     General: Skin is warm and dry.      Comments: 2 cm skin tear on outer right elbow, no drainage or swelling,    Neurological:      General: No focal deficit present.      Mental Status: She is alert and oriented to person, place, and time. Mental status is at baseline.   Psychiatric:     "     Mood and Affect: Mood normal.         Behavior: Behavior normal.         Thought Content: Thought content normal.         Judgment: Judgment normal.         /77 (BP Location: Left arm)   Pulse 87   Temp 36.5 °C (97.7 °F) (Temporal)   Ht 1.626 m (5' 4\")   Wt 117 kg (257 lb)   SpO2 96%   BMI 44.11 kg/m²     Assessment/Plan    Problem List Items Addressed This Visit       Anxiety - Primary    Overview     Used restoril for sleep and anxiety  Insurance will no long cover  Xanax started 10/2/23         Current Assessment & Plan     Restoril not covered by insurance  Lorazepam          Relevant Medications    ALPRAZolam (Xanax) 0.25 mg tablet    Other Relevant Orders    Opiate/Opioid/Benzo Extended Prescription Compliance    Chronic GERD    Overview     Uses carafate prn          Relevant Medications    sucralfate (Carafate) 1 gram tablet    CKD (chronic kidney disease) stage 3, GFR 30-59 ml/min (CMS/HCC)    Overview     On ARB  Stable since 2019  Dr. Pope q6m  3/22 Cr 1.31, GFR 44  7/23 Cr 1.24, GFR 46         Current Assessment & Plan     Most recent BMP in July  Stable, avoid nephrotoxic medications         Major depressive disorder, single episode, moderate (CMS/HCC)    Current Assessment & Plan     Feels improved  Still grieving loss of daughter  Has friends to talk to, Taoist  No medications at this time, stable         Routine adult health maintenance    Overview       Moderna COVID 19 vaccine 3/8/21, 4/5/21, 11/5/21      Influenza Seasonal Inj Age 3+10/10/2018, 9/30/22       Pneumococcal-13 Vac Conjugate1/5/2017       Sontapofu68/6/2015, 9/29/20       TD Adult11/1/2005       Tdap (Age 7+)8/20/2012, 12/13/22      Cscope - 9/21 (repeat 6 mo); 2/5/22 (4yrs)      Mammogram 11/18, 12/3/19, 2/12/21, 2/16/22; 3/23      BMD 12/19; 2/16/22      s/p TAHBSO      Hep C Ab 10/6/15 (-)      4/22 CT abd: No AAA      12/13/22: Current 10-Year ASCVD Risk: 8.41% - Intermediate Risk         Sleep disturbance    " Overview     Used restoril for sleep and anxiety  Insurance will no long cover  Xanax started 10/2/23         Relevant Medications    ALPRAZolam (Xanax) 0.25 mg tablet    Other Relevant Orders    Opiate/Opioid/Benzo Extended Prescription Compliance     Other Visit Diagnoses       Encounter for therapeutic drug level monitoring        Relevant Orders    Opiate/Opioid/Benzo Extended Prescription Compliance    Skin tear of right elbow without complication, initial encounter        Relevant Medications    silver sulfADIAZINE (Silvadene) 1 % cream    Acute recurrent frontal sinusitis        Relevant Medications    azithromycin (Zithromax) 250 mg tablet    Acute otitis externa of right ear, unspecified type        Relevant Medications    neomycin-polymyxin-HC (Cortisporin) otic solution           OARRS:  Coni Gaming, APRN-CNP on 10/2/2023  1:38 PM  I have personally reviewed the OARRS report for Janell Rivera. I have considered the risks of abuse, dependence, addiction and diversion and I believe that it is clinically appropriate for Janell Rivera to be prescribed this medication    Is the patient prescribed a combination of a benzodiazepine and opioid?  No    Last Urine Drug Screen / ordered today: Yes  Recent Results (from the past 8760 hour(s))   Opiate/Opioid/Benzo Prescription Compliance Screen    Collection Time: 10/02/23  1:59 PM   Result Value Ref Range    Creatinine, Urine Random 45.8 20.0 - 320.0 mg/dL    Amphetamine Screen, Urine Presumptive Negative Presumptive Negative    Barbiturate Screen, Urine Presumptive Negative Presumptive Negative    Cannabinoid Screen, Urine Presumptive Negative Presumptive Negative    Cocaine Metabolite Screen, Urine Presumptive Negative Presumptive Negative    PCP Screen, Urine Presumptive Negative Presumptive Negative     Results are as expected.       Controlled Substance Agreement:  Date of the Last Agreement: 10/2/23  Reviewed Controlled Substance Agreement including but  not limited to the benefits, risks, and alternatives to treatment with a Controlled Substance medication(s).    Benzodiazepines:  What is the patient's goal of therapy? Sleep and anxiety, uses sparingly  Is this being achieved with current treatment? yes    MILLA-7:  Over the last 2 weeks, how often have you been bothered by any of the following problems?  Feeling nervous, anxious, or on edge: 1  Not being able to stop or control worryin  Worrying too much about different things: 3  Trouble relaxin  Being so restless that it is hard to sit still: 0  Becoming easily annoyed or irritable: 0  Feeling afraid as if something awful might happen: 3  MILLA-7 Total Score: 9        Activities of Daily Living:   Is your overall impression that this patient is benefiting (symptom reduction outweighs side effects) from benzodiazepine therapy? Yes     1. Physical Functioning: Better  2. Family Relationship: Same  3. Social Relationship: Better  4. Mood: Better  5. Sleep Patterns: Same  6. Overall Function: Better

## 2023-10-02 NOTE — PATIENT INSTRUCTIONS
Trial xanax as needed  1/2 tablet to 1 full tablet as needed at night or for anxiety    Silver cream to skin tear - cover if able

## 2023-10-03 LAB
AMPHETAMINES UR QL SCN: NORMAL
BARBITURATES UR QL SCN: NORMAL
BZE UR QL SCN: NORMAL
CANNABINOIDS UR QL SCN: NORMAL
CREAT UR-MCNC: 45.8 MG/DL (ref 20–320)
PCP UR QL SCN: NORMAL

## 2023-10-03 NOTE — ASSESSMENT & PLAN NOTE
Feels improved  Still grieving loss of daughter  Has friends to talk to, Roman [employer]  No medications at this time, stable

## 2023-10-24 DIAGNOSIS — K21.9 CHRONIC GERD: ICD-10-CM

## 2023-10-24 RX ORDER — SUCRALFATE 1 G/1
1 TABLET ORAL
Qty: 360 TABLET | Refills: 3 | Status: SHIPPED | OUTPATIENT
Start: 2023-10-24 | End: 2024-10-23

## 2023-11-02 DIAGNOSIS — L70.9 ACNE, UNSPECIFIED ACNE TYPE: ICD-10-CM

## 2023-11-02 RX ORDER — CLINDAMYCIN PHOSPHATE 10 MG/G
GEL TOPICAL AS NEEDED
Qty: 30 G | Refills: 3 | Status: SHIPPED | OUTPATIENT
Start: 2023-11-02

## 2023-11-18 PROBLEM — H10.9 CONJUNCTIVITIS: Status: RESOLVED | Noted: 2019-08-30 | Resolved: 2023-11-18

## 2023-11-18 PROBLEM — B02.9 HERPES ZOSTER WITHOUT COMPLICATION: Status: RESOLVED | Noted: 2023-08-18 | Resolved: 2023-11-18

## 2023-11-18 PROBLEM — G43.909 MIGRAINE WITHOUT STATUS MIGRAINOSUS, NOT INTRACTABLE: Status: RESOLVED | Noted: 2023-01-28 | Resolved: 2023-11-18

## 2023-11-18 PROBLEM — Z71.89 ADVANCED DIRECTIVES, COUNSELING/DISCUSSION: Status: ACTIVE | Noted: 2023-11-18

## 2023-11-18 PROBLEM — S92.501A FRACTURE OF FIFTH TOE, RIGHT, CLOSED, INITIAL ENCOUNTER: Status: RESOLVED | Noted: 2017-09-22 | Resolved: 2023-11-18

## 2023-11-18 PROBLEM — M79.671 PAIN IN RIGHT FOOT: Status: RESOLVED | Noted: 2017-09-22 | Resolved: 2023-11-18

## 2023-11-18 PROBLEM — E66.09 OBESITY DUE TO EXCESS CALORIES: Status: RESOLVED | Noted: 2017-04-24 | Resolved: 2023-11-18

## 2023-11-18 PROBLEM — M79.89 SUBCUTANEOUS FAT NECROSIS: Status: RESOLVED | Noted: 2023-01-28 | Resolved: 2023-11-18

## 2023-11-18 PROBLEM — Z98.890 HISTORY OF COLONOSCOPY: Status: RESOLVED | Noted: 2023-08-18 | Resolved: 2023-11-18

## 2023-11-18 PROBLEM — N28.9 IMPAIRED RENAL FUNCTION: Status: RESOLVED | Noted: 2023-08-18 | Resolved: 2023-11-18

## 2023-11-18 PROBLEM — F41.9 ANXIETY AND DEPRESSION: Status: RESOLVED | Noted: 2023-08-18 | Resolved: 2023-11-18

## 2023-11-18 PROBLEM — F32.A ANXIETY AND DEPRESSION: Status: RESOLVED | Noted: 2023-08-18 | Resolved: 2023-11-18

## 2023-12-28 DIAGNOSIS — H66.91 ACUTE OTITIS MEDIA, RIGHT: ICD-10-CM

## 2023-12-28 RX ORDER — FLUTICASONE PROPIONATE 50 MCG
2 SPRAY, SUSPENSION (ML) NASAL DAILY
Qty: 48 ML | Refills: 1 | Status: SHIPPED | OUTPATIENT
Start: 2023-12-28 | End: 2024-04-10

## 2023-12-30 DIAGNOSIS — N18.30 CHRONIC KIDNEY DISEASE, STAGE 3 UNSPECIFIED (MULTI): ICD-10-CM

## 2023-12-31 RX ORDER — CANDESARTAN CILEXETIL AND HYDROCHLOROTHIAZIDE 16; 12.5 MG/1; MG/1
1 TABLET ORAL DAILY
Qty: 90 TABLET | Refills: 2 | Status: SHIPPED | OUTPATIENT
Start: 2023-12-31 | End: 2024-01-09 | Stop reason: SDUPTHER

## 2024-01-02 ENCOUNTER — LAB (OUTPATIENT)
Dept: LAB | Facility: LAB | Age: 74
End: 2024-01-02
Payer: COMMERCIAL

## 2024-01-02 ENCOUNTER — OFFICE VISIT (OUTPATIENT)
Dept: PRIMARY CARE | Facility: CLINIC | Age: 74
End: 2024-01-02
Payer: COMMERCIAL

## 2024-01-02 VITALS
WEIGHT: 257 LBS | HEART RATE: 77 BPM | TEMPERATURE: 97.5 F | DIASTOLIC BLOOD PRESSURE: 62 MMHG | SYSTOLIC BLOOD PRESSURE: 92 MMHG | OXYGEN SATURATION: 96 % | BODY MASS INDEX: 44.11 KG/M2

## 2024-01-02 DIAGNOSIS — N18.31 STAGE 3A CHRONIC KIDNEY DISEASE (MULTI): ICD-10-CM

## 2024-01-02 DIAGNOSIS — E55.9 VITAMIN D DEFICIENCY: ICD-10-CM

## 2024-01-02 DIAGNOSIS — D64.9 ANEMIA, UNSPECIFIED TYPE: ICD-10-CM

## 2024-01-02 DIAGNOSIS — F41.9 ANXIETY: ICD-10-CM

## 2024-01-02 DIAGNOSIS — F32.1 MAJOR DEPRESSIVE DISORDER, SINGLE EPISODE, MODERATE (MULTI): ICD-10-CM

## 2024-01-02 DIAGNOSIS — R79.89 LOW VITAMIN B12 LEVEL: ICD-10-CM

## 2024-01-02 DIAGNOSIS — I10 ESSENTIAL HYPERTENSION, BENIGN: ICD-10-CM

## 2024-01-02 DIAGNOSIS — R09.89 SYMPTOMS OF UPPER RESPIRATORY INFECTION (URI): Primary | ICD-10-CM

## 2024-01-02 PROCEDURE — 3078F DIAST BP <80 MM HG: CPT | Performed by: INTERNAL MEDICINE

## 2024-01-02 PROCEDURE — 87636 SARSCOV2 & INF A&B AMP PRB: CPT

## 2024-01-02 PROCEDURE — 1036F TOBACCO NON-USER: CPT | Performed by: INTERNAL MEDICINE

## 2024-01-02 PROCEDURE — 3074F SYST BP LT 130 MM HG: CPT | Performed by: INTERNAL MEDICINE

## 2024-01-02 PROCEDURE — 3008F BODY MASS INDEX DOCD: CPT | Performed by: INTERNAL MEDICINE

## 2024-01-02 PROCEDURE — 99214 OFFICE O/P EST MOD 30 MIN: CPT | Performed by: INTERNAL MEDICINE

## 2024-01-02 PROCEDURE — 1160F RVW MEDS BY RX/DR IN RCRD: CPT | Performed by: INTERNAL MEDICINE

## 2024-01-02 PROCEDURE — 87634 RSV DNA/RNA AMP PROBE: CPT

## 2024-01-02 PROCEDURE — 1159F MED LIST DOCD IN RCRD: CPT | Performed by: INTERNAL MEDICINE

## 2024-01-02 RX ORDER — AZITHROMYCIN 250 MG/1
TABLET, FILM COATED ORAL
Qty: 6 TABLET | Refills: 0 | Status: SHIPPED | OUTPATIENT
Start: 2024-01-02 | End: 2024-01-09 | Stop reason: WASHOUT

## 2024-01-02 ASSESSMENT — PATIENT HEALTH QUESTIONNAIRE - PHQ9
SUM OF ALL RESPONSES TO PHQ9 QUESTIONS 1 AND 2: 0
1. LITTLE INTEREST OR PLEASURE IN DOING THINGS: NOT AT ALL
2. FEELING DOWN, DEPRESSED OR HOPELESS: NOT AT ALL

## 2024-01-02 NOTE — PROGRESS NOTES
Chief Complaint/HPI:  Flu Symptoms (Headache/Sore throat/Fatigue /Cough/Runny nose /Denies sob or fever /Otc )  Sx started 6 days ago  Bad HA then ST and nasal drainage, watery eyes  + cough  Did ache all over, but better  Feels better today  Didnt COVID test self  Took Tylenol and zyrtec    ASSESSMENT/PLAN  Problem List Items Addressed This Visit          Medium    Anemia    Overview     ? due to Decreased iron in Diet vs CKD (ACD)         Relevant Orders    Iron and TIBC    Ferritin    Anxiety    Overview     Used restoril for sleep and anxiety  Insurance will no long cover  Xanax started 10/2/23  On ZOloft         Relevant Orders    TSH with reflex to Free T4 if abnormal    Body mass index (BMI) 40.0-44.9, adult (CMS/[facility])    CKD (chronic kidney disease) stage 3, GFR 30-59 ml/min (CMS/[facility])    Overview     On ARB  Stable since 2019  Dr. Pope q6m  3/22 Cr 1.31, GFR 44  7/23 Cr 1.24, GFR 46         Relevant Orders    Magnesium    Essential hypertension, benign    Overview      Goal <130/80      On ARB/HCTZ      10-/20: ours 150/78 hr 98            hers: 139/86 hr 87      Stable      Monitor         Relevant Orders    Comprehensive Metabolic Panel    CBC and Auto Differential    Lipid Panel    Urinalysis with Reflex Culture and Microscopic    Low vitamin B12 level    Overview     on supplement      monitor         Relevant Orders    Vitamin B12    Major depressive disorder, single episode, moderate (CMS/[facility])    Overview     On zoloft  Stable  Moitor         Vitamin D deficiency    Overview     Goal 30-40 (hx kidney stones)      on 50K monthly         Relevant Orders    Vitamin D 25-Hydroxy,Total (for eval of Vitamin D levels)     Other Visit Diagnoses       Symptoms of upper respiratory infection (URI)    -  Primary    Suspect one of the circulating viruses  will swab  Abx in case needed, if sx persist and not viral    Relevant Medications    azithromycin (Zithromax) 250 mg tablet    Other Relevant Orders     Influenza A, and B PCR    RSV PCR    Sars-CoV-2 PCR, Symptomatic              ALLERGIES  Iodinated contrast media, Allegra [fexofenadine], Azelastine, Clindamycin, Codeine, Compazine [prochlorperazine], Lorazepam, Sudafed [pseudoephedrine hcl], and Amoxicillin    MEDICATIONS  Current Outpatient Medications   Medication Instructions    acetaminophen (TYLENOL) 1,000 mg, oral, Every 8 hours PRN    albuterol 90 mcg/actuation inhaler 2 puffs, inhalation, Every 4 hours PRN, Every 4-6 hours as needed    ALPRAZolam (XANAX) 0.25 mg, oral, Nightly PRN    azithromycin (Zithromax) 250 mg tablet Take 2 tablets (500 mg) by mouth once daily for 1 day, THEN 1 tablet (250 mg) once daily for 4 days. Take 2 tabs (500 mg) by mouth today, than 1 daily for 4 days..    candesartan-hydrochlorothiazid (Atacand HCT) 16-12.5 mg tablet 1 tablet, oral, Daily    cetirizine (ZYRTEC) 10 mg, oral, Nightly    cholecalciferol (VITAMIN D-3) 25 mcg, oral, Every other day, WITH FOOD    clindamycin (Cleocin T) 1 % gel Topical, As needed    cyanocobalamin (VITAMIN B-12) 100 mcg, oral, 3 times weekly    ergocalciferol (Vitamin D-2) 1.25 MG (99998 UT) capsule TAKE 1 CAPSULE ONCE WEEKLY X3 EVERY MONTH.    ferrous sulfate 65 mg, oral, 3 times weekly, Do not crush, chew, or split.     fluticasone (Flonase) 50 mcg/actuation nasal spray 2 sprays, Each Nostril, Daily    neomycin-polymyxin-HC (Cortisporin) otic solution 4 drops to affected ear 3-4 times a day for 7 days    omeprazole (PRILOSEC) 20 mg, oral, Daily before breakfast    simethicone (MYLICON) 80 mg, oral, Daily, Before meals    sucralfate (CARAFATE) 1 g, oral, 4 times daily before meals and nightly    SUMAtriptan (IMITREX) 50 mg, oral, Once as needed, May repeat dose once in 2 hours if no relief.  Do not exceed 2 doses in 24 hours.    ubrogepant (UBRELVY) 100 mg, oral, As needed, Use as needed for HA, may repeat x1 in 2 hours        ROS otherwise negative aside from what was mentioned above in  HPI.    PHYSICAL EXAM  BP 92/62   Pulse 77   Temp 36.4 °C (97.5 °F) (Temporal)   Wt 117 kg (257 lb)   SpO2 96%   BMI 44.11 kg/m²   Body mass index is 44.11 kg/m².  Gen: Alert, NAD  HEENT:  PERRLA, EOMI, conjunctiva and sclera normal in appearance  Respiratory:  Lungs CTAB  Cardiovascular:  Heart RRR. No M/R/G  Neuro:  Gross motor and sensory intact  Skin:  No suspicious lesions present

## 2024-01-03 ENCOUNTER — LAB (OUTPATIENT)
Dept: LAB | Facility: LAB | Age: 74
End: 2024-01-03
Payer: COMMERCIAL

## 2024-01-03 DIAGNOSIS — F41.9 ANXIETY: ICD-10-CM

## 2024-01-03 DIAGNOSIS — E55.9 VITAMIN D DEFICIENCY: ICD-10-CM

## 2024-01-03 DIAGNOSIS — D64.9 ANEMIA, UNSPECIFIED TYPE: ICD-10-CM

## 2024-01-03 DIAGNOSIS — I10 ESSENTIAL HYPERTENSION, BENIGN: ICD-10-CM

## 2024-01-03 DIAGNOSIS — R79.89 LOW VITAMIN B12 LEVEL: ICD-10-CM

## 2024-01-03 DIAGNOSIS — N18.31 STAGE 3A CHRONIC KIDNEY DISEASE (MULTI): ICD-10-CM

## 2024-01-03 LAB
25(OH)D3 SERPL-MCNC: 39 NG/ML (ref 30–100)
ALBUMIN SERPL BCP-MCNC: 4.1 G/DL (ref 3.4–5)
ALP SERPL-CCNC: 66 U/L (ref 33–136)
ALT SERPL W P-5'-P-CCNC: 28 U/L (ref 7–45)
ANION GAP SERPL CALC-SCNC: 14 MMOL/L (ref 10–20)
APPEARANCE UR: CLEAR
AST SERPL W P-5'-P-CCNC: 25 U/L (ref 9–39)
BACTERIA #/AREA URNS AUTO: ABNORMAL /HPF
BASOPHILS # BLD AUTO: 0.03 X10*3/UL (ref 0–0.1)
BASOPHILS NFR BLD AUTO: 0.8 %
BILIRUB SERPL-MCNC: 0.6 MG/DL (ref 0–1.2)
BILIRUB UR STRIP.AUTO-MCNC: NEGATIVE MG/DL
BUN SERPL-MCNC: 32 MG/DL (ref 6–23)
CALCIUM SERPL-MCNC: 8.8 MG/DL (ref 8.6–10.3)
CHLORIDE SERPL-SCNC: 100 MMOL/L (ref 98–107)
CHOLEST SERPL-MCNC: 202 MG/DL (ref 0–199)
CHOLESTEROL/HDL RATIO: 4.8
CO2 SERPL-SCNC: 29 MMOL/L (ref 21–32)
COLOR UR: ABNORMAL
CREAT SERPL-MCNC: 1.44 MG/DL (ref 0.5–1.05)
EOSINOPHIL # BLD AUTO: 0.27 X10*3/UL (ref 0–0.4)
EOSINOPHIL NFR BLD AUTO: 6.9 %
ERYTHROCYTE [DISTWIDTH] IN BLOOD BY AUTOMATED COUNT: 12.7 % (ref 11.5–14.5)
FERRITIN SERPL-MCNC: 148 NG/ML (ref 8–150)
FLUAV RNA RESP QL NAA+PROBE: NOT DETECTED
FLUBV RNA RESP QL NAA+PROBE: NOT DETECTED
GFR SERPL CREATININE-BSD FRML MDRD: 39 ML/MIN/1.73M*2
GLUCOSE SERPL-MCNC: 87 MG/DL (ref 74–99)
GLUCOSE UR STRIP.AUTO-MCNC: NEGATIVE MG/DL
HCT VFR BLD AUTO: 35.4 % (ref 36–46)
HDLC SERPL-MCNC: 42.2 MG/DL
HGB BLD-MCNC: 11.8 G/DL (ref 12–16)
IMM GRANULOCYTES # BLD AUTO: 0.01 X10*3/UL (ref 0–0.5)
IMM GRANULOCYTES NFR BLD AUTO: 0.3 % (ref 0–0.9)
IRON SATN MFR SERPL: 32 % (ref 25–45)
IRON SERPL-MCNC: 97 UG/DL (ref 35–150)
KETONES UR STRIP.AUTO-MCNC: NEGATIVE MG/DL
LDLC SERPL CALC-MCNC: 133 MG/DL
LEUKOCYTE ESTERASE UR QL STRIP.AUTO: NEGATIVE
LYMPHOCYTES # BLD AUTO: 1.77 X10*3/UL (ref 0.8–3)
LYMPHOCYTES NFR BLD AUTO: 45 %
MAGNESIUM SERPL-MCNC: 2.2 MG/DL (ref 1.6–2.4)
MCH RBC QN AUTO: 28.4 PG (ref 26–34)
MCHC RBC AUTO-ENTMCNC: 33.3 G/DL (ref 32–36)
MCV RBC AUTO: 85 FL (ref 80–100)
MONOCYTES # BLD AUTO: 0.38 X10*3/UL (ref 0.05–0.8)
MONOCYTES NFR BLD AUTO: 9.7 %
NEUTROPHILS # BLD AUTO: 1.47 X10*3/UL (ref 1.6–5.5)
NEUTROPHILS NFR BLD AUTO: 37.3 %
NITRITE UR QL STRIP.AUTO: NEGATIVE
NON HDL CHOLESTEROL: 160 MG/DL (ref 0–149)
NRBC BLD-RTO: 0 /100 WBCS (ref 0–0)
PH UR STRIP.AUTO: 6 [PH]
PLATELET # BLD AUTO: 240 X10*3/UL (ref 150–450)
POTASSIUM SERPL-SCNC: 3.8 MMOL/L (ref 3.5–5.3)
PROT SERPL-MCNC: 7.1 G/DL (ref 6.4–8.2)
PROT UR STRIP.AUTO-MCNC: NEGATIVE MG/DL
RBC # BLD AUTO: 4.15 X10*6/UL (ref 4–5.2)
RBC # UR STRIP.AUTO: ABNORMAL /UL
RBC #/AREA URNS AUTO: ABNORMAL /HPF
RSV RNA RESP QL NAA+PROBE: NOT DETECTED
SARS-COV-2 RNA RESP QL NAA+PROBE: DETECTED
SODIUM SERPL-SCNC: 139 MMOL/L (ref 136–145)
SP GR UR STRIP.AUTO: 1.01
SQUAMOUS #/AREA URNS AUTO: ABNORMAL /HPF
TIBC SERPL-MCNC: 307 UG/DL (ref 240–445)
TRIGL SERPL-MCNC: 133 MG/DL (ref 0–149)
TSH SERPL-ACNC: 1.69 MIU/L (ref 0.44–3.98)
UIBC SERPL-MCNC: 210 UG/DL (ref 110–370)
UROBILINOGEN UR STRIP.AUTO-MCNC: <2 MG/DL
VIT B12 SERPL-MCNC: 573 PG/ML (ref 211–911)
VLDL: 27 MG/DL (ref 0–40)
WBC # BLD AUTO: 3.9 X10*3/UL (ref 4.4–11.3)
WBC #/AREA URNS AUTO: ABNORMAL /HPF

## 2024-01-03 PROCEDURE — 80053 COMPREHEN METABOLIC PANEL: CPT

## 2024-01-03 PROCEDURE — 83540 ASSAY OF IRON: CPT

## 2024-01-03 PROCEDURE — 85025 COMPLETE CBC W/AUTO DIFF WBC: CPT

## 2024-01-03 PROCEDURE — 84443 ASSAY THYROID STIM HORMONE: CPT

## 2024-01-03 PROCEDURE — 81001 URINALYSIS AUTO W/SCOPE: CPT

## 2024-01-03 PROCEDURE — 82728 ASSAY OF FERRITIN: CPT

## 2024-01-03 PROCEDURE — 83550 IRON BINDING TEST: CPT

## 2024-01-03 PROCEDURE — 80061 LIPID PANEL: CPT

## 2024-01-03 PROCEDURE — 36415 COLL VENOUS BLD VENIPUNCTURE: CPT

## 2024-01-03 PROCEDURE — 82306 VITAMIN D 25 HYDROXY: CPT

## 2024-01-03 PROCEDURE — 82607 VITAMIN B-12: CPT

## 2024-01-03 PROCEDURE — 83735 ASSAY OF MAGNESIUM: CPT

## 2024-01-04 LAB — HOLD SPECIMEN: NORMAL

## 2024-01-09 ENCOUNTER — OFFICE VISIT (OUTPATIENT)
Dept: PRIMARY CARE | Facility: CLINIC | Age: 74
End: 2024-01-09
Payer: COMMERCIAL

## 2024-01-09 VITALS
HEIGHT: 65 IN | SYSTOLIC BLOOD PRESSURE: 117 MMHG | WEIGHT: 258 LBS | OXYGEN SATURATION: 95 % | DIASTOLIC BLOOD PRESSURE: 77 MMHG | TEMPERATURE: 97.7 F | HEART RATE: 83 BPM | BODY MASS INDEX: 42.99 KG/M2

## 2024-01-09 DIAGNOSIS — Z12.31 ENCOUNTER FOR SCREENING MAMMOGRAM FOR BREAST CANCER: ICD-10-CM

## 2024-01-09 DIAGNOSIS — Z71.89 ADVANCED DIRECTIVES, COUNSELING/DISCUSSION: ICD-10-CM

## 2024-01-09 DIAGNOSIS — D64.9 ANEMIA, UNSPECIFIED TYPE: ICD-10-CM

## 2024-01-09 DIAGNOSIS — Z71.89 CARDIAC RISK COUNSELING: ICD-10-CM

## 2024-01-09 DIAGNOSIS — Z79.899 MEDICATION MANAGEMENT: ICD-10-CM

## 2024-01-09 DIAGNOSIS — E55.9 VITAMIN D DEFICIENCY: ICD-10-CM

## 2024-01-09 DIAGNOSIS — J30.89 NON-SEASONAL ALLERGIC RHINITIS, UNSPECIFIED TRIGGER: ICD-10-CM

## 2024-01-09 DIAGNOSIS — N18.31 STAGE 3A CHRONIC KIDNEY DISEASE (MULTI): ICD-10-CM

## 2024-01-09 DIAGNOSIS — E53.8 B12 DEFICIENCY: ICD-10-CM

## 2024-01-09 DIAGNOSIS — I10 ESSENTIAL HYPERTENSION, BENIGN: ICD-10-CM

## 2024-01-09 DIAGNOSIS — E66.01 CLASS 3 SEVERE OBESITY DUE TO EXCESS CALORIES WITH SERIOUS COMORBIDITY AND BODY MASS INDEX (BMI) OF 40.0 TO 44.9 IN ADULT (MULTI): ICD-10-CM

## 2024-01-09 DIAGNOSIS — F41.9 ANXIETY: ICD-10-CM

## 2024-01-09 DIAGNOSIS — Z00.00 ROUTINE ADULT HEALTH MAINTENANCE: Primary | ICD-10-CM

## 2024-01-09 DIAGNOSIS — F32.1 MAJOR DEPRESSIVE DISORDER, SINGLE EPISODE, MODERATE (MULTI): ICD-10-CM

## 2024-01-09 DIAGNOSIS — Z78.0 MENOPAUSE: ICD-10-CM

## 2024-01-09 DIAGNOSIS — Z13.89 SCREENING FOR MULTIPLE CONDITIONS: ICD-10-CM

## 2024-01-09 DIAGNOSIS — K59.09 CHRONIC CONSTIPATION: ICD-10-CM

## 2024-01-09 DIAGNOSIS — Z29.11 NEED FOR RSV VACCINATION: ICD-10-CM

## 2024-01-09 PROBLEM — E66.813 CLASS 3 SEVERE OBESITY DUE TO EXCESS CALORIES WITH SERIOUS COMORBIDITY AND BODY MASS INDEX (BMI) OF 40.0 TO 44.9 IN ADULT: Status: ACTIVE | Noted: 2024-01-02

## 2024-01-09 PROBLEM — F51.04 CHRONIC INSOMNIA: Status: RESOLVED | Noted: 2023-08-18 | Resolved: 2024-01-09

## 2024-01-09 PROBLEM — R73.01 IFG (IMPAIRED FASTING GLUCOSE): Status: RESOLVED | Noted: 2023-08-18 | Resolved: 2024-01-09

## 2024-01-09 PROBLEM — F51.01 PRIMARY INSOMNIA: Status: ACTIVE | Noted: 2023-03-13

## 2024-01-09 PROBLEM — R10.33 UMBILICAL PAIN: Status: RESOLVED | Noted: 2023-01-28 | Resolved: 2024-01-09

## 2024-01-09 PROCEDURE — G0444 DEPRESSION SCREEN ANNUAL: HCPCS | Performed by: INTERNAL MEDICINE

## 2024-01-09 PROCEDURE — 99497 ADVNCD CARE PLAN 30 MIN: CPT | Performed by: INTERNAL MEDICINE

## 2024-01-09 PROCEDURE — G0439 PPPS, SUBSEQ VISIT: HCPCS | Performed by: INTERNAL MEDICINE

## 2024-01-09 PROCEDURE — G0447 BEHAVIOR COUNSEL OBESITY 15M: HCPCS | Performed by: INTERNAL MEDICINE

## 2024-01-09 PROCEDURE — 1036F TOBACCO NON-USER: CPT | Performed by: INTERNAL MEDICINE

## 2024-01-09 PROCEDURE — 3078F DIAST BP <80 MM HG: CPT | Performed by: INTERNAL MEDICINE

## 2024-01-09 PROCEDURE — 1160F RVW MEDS BY RX/DR IN RCRD: CPT | Performed by: INTERNAL MEDICINE

## 2024-01-09 PROCEDURE — 3008F BODY MASS INDEX DOCD: CPT | Performed by: INTERNAL MEDICINE

## 2024-01-09 PROCEDURE — 3074F SYST BP LT 130 MM HG: CPT | Performed by: INTERNAL MEDICINE

## 2024-01-09 PROCEDURE — 1159F MED LIST DOCD IN RCRD: CPT | Performed by: INTERNAL MEDICINE

## 2024-01-09 PROCEDURE — G0446 INTENS BEHAVE THER CARDIO DX: HCPCS | Performed by: INTERNAL MEDICINE

## 2024-01-09 PROCEDURE — 99214 OFFICE O/P EST MOD 30 MIN: CPT | Performed by: INTERNAL MEDICINE

## 2024-01-09 PROCEDURE — 99397 PER PM REEVAL EST PAT 65+ YR: CPT | Performed by: INTERNAL MEDICINE

## 2024-01-09 RX ORDER — CANDESARTAN CILEXETIL AND HYDROCHLOROTHIAZIDE 16; 12.5 MG/1; MG/1
1 TABLET ORAL DAILY
Qty: 90 TABLET | Refills: 3 | Status: SHIPPED | OUTPATIENT
Start: 2024-01-09

## 2024-01-09 RX ORDER — DOCUSATE SODIUM 100 MG/1
100 CAPSULE, LIQUID FILLED ORAL 2 TIMES DAILY
Qty: 60 CAPSULE | Refills: 0
Start: 2024-01-09

## 2024-01-09 RX ORDER — CETIRIZINE HYDROCHLORIDE 10 MG/1
10 TABLET ORAL DAILY
Qty: 30 TABLET | Refills: 11
Start: 2024-01-09 | End: 2025-01-08

## 2024-01-09 ASSESSMENT — ENCOUNTER SYMPTOMS
DEPRESSION: 0
LOSS OF SENSATION IN FEET: 0
OCCASIONAL FEELINGS OF UNSTEADINESS: 0

## 2024-01-09 NOTE — ASSESSMENT & PLAN NOTE
Annual Wellness exam completed   Preventive Health History reviewed  Ordered:   Labs    Mammogram   BMD   PCV 20 and RSV at pharmacy

## 2024-01-09 NOTE — PROGRESS NOTES
Medicare Wellness Exam/Comprehensive Problem Focused Follow Up and Physical Exam  Chief Complaint   Patient presents with    Medicare Annual Wellness Visit Subsequent     HPI: Had COVID  Didnt need abx  Feeling better  Trying to get out more in [facility]    Labs reviewed:  Component      Latest Ref Rng 1/3/2024   WBC      4.4 - 11.3 x10*3/uL 3.9 (L)    nRBC      0.0 - 0.0 /100 WBCs 0.0    RBC      4.00 - 5.20 x10*6/uL 4.15    HEMOGLOBIN      12.0 - 16.0 g/dL 11.8 (L)    HEMATOCRIT      36.0 - 46.0 % 35.4 (L)    MCV      80 - 100 fL 85    MCH      26.0 - 34.0 pg 28.4    MCHC      32.0 - 36.0 g/dL 33.3    RED CELL DISTRIBUTION WIDTH      11.5 - 14.5 % 12.7    Platelets      150 - 450 x10*3/uL 240    Neutrophils %      40.0 - 80.0 % 37.3    Immature Granulocytes %, Automated      0.0 - 0.9 % 0.3    Lymphocytes %      13.0 - 44.0 % 45.0    Monocytes %      2.0 - 10.0 % 9.7    Eosinophils %      0.0 - 6.0 % 6.9    Basophils %      0.0 - 2.0 % 0.8    Neutrophils Absolute      1.60 - 5.50 x10*3/uL 1.47 (L)    Immature Granulocytes Absolute, Automated      0.00 - 0.50 x10*3/uL 0.01    Lymphocytes Absolute      0.80 - 3.00 x10*3/uL 1.77    Monocytes Absolute      0.05 - 0.80 x10*3/uL 0.38    Eosinophils Absolute      0.00 - 0.40 x10*3/uL 0.27    Basophils Absolute      0.00 - 0.10 x10*3/uL 0.03    GLUCOSE      74 - 99 mg/dL 87    SODIUM      136 - 145 mmol/L 139    POTASSIUM      3.5 - 5.3 mmol/L 3.8    CHLORIDE      98 - 107 mmol/L 100    Bicarbonate      21 - 32 mmol/L 29    Anion Gap      10 - 20 mmol/L 14    Blood Urea Nitrogen      6 - 23 mg/dL 32 (H)    Creatinine      0.50 - 1.05 mg/dL 1.44 (H)    EGFR      >60 mL/min/1.73m*2 39 (L)    Calcium      8.6 - 10.3 mg/dL 8.8    Albumin      3.4 - 5.0 g/dL 4.1    Alkaline Phosphatase      33 - 136 U/L 66    Total Protein      6.4 - 8.2 g/dL 7.1    AST      9 - 39 U/L 25    Bilirubin Total      0.0 - 1.2 mg/dL 0.6    ALT      7 - 45 U/L 28    Color, Urine       Straw, Yellow  Straw    Appearance, Urine      Clear  Clear    Specific Gravity, Urine      1.005 - 1.035  1.010    pH, Urine      5.0, 5.5, 6.0, 6.5, 7.0, 7.5, 8.0  6.0    Protein, Urine      NEGATIVE mg/dL NEGATIVE    Glucose, Urine      NEGATIVE mg/dL NEGATIVE    Blood, Urine      NEGATIVE  SMALL (1+) !    Ketones, Urine      NEGATIVE mg/dL NEGATIVE    Bilirubin, Urine      NEGATIVE  NEGATIVE    Urobilinogen, Urine      <2.0 mg/dL <2.0    Nitrite, Urine      NEGATIVE  NEGATIVE    Leukocyte Esterase, Urine      NEGATIVE  NEGATIVE    CHOLESTEROL      0 - 199 mg/dL 202 (H)    HDL CHOLESTEROL      mg/dL 42.2    Cholesterol/HDL Ratio 4.8    LDL Calculated      <=99 mg/dL 133 (H)    VLDL      0 - 40 mg/dL 27    TRIGLYCERIDES      0 - 149 mg/dL 133    Non HDL Cholesterol      0 - 149 mg/dL 160 (H)    IRON      35 - 150 ug/dL 97    UIBC      110 - 370 ug/dL 210    TIBC      240 - 445 ug/dL 307    % Saturation      25 - 45 % 32    WBC, Urine      1-5, NONE /HPF NONE    RBC, Urine      NONE, 1-2, 3-5 /HPF NONE    Squamous Epithelial Cells, Urine      Reference range not established. /HPF 1-9 (SPARSE)    Bacteria, Urine      NONE SEEN /HPF 1+ !    Vitamin D, 25-Hydroxy, Total      30 - 100 ng/mL 39    Thyroid Stimulating Hormone      0.44 - 3.98 mIU/L 1.69    Vitamin B12      211 - 911 pg/mL 573    MAGNESIUM      1.60 - 2.40 mg/dL 2.20    FERRITIN      8 - 150 ng/mL 148    Extra Tube Hold for add-ons.    On Iron 3x/week       Assessment and Plan:  Problem List Items Addressed This Visit          High    Routine adult health maintenance - Primary    Overview                   Moderna COVID 19 vaccine 3/8/21, 4/5/21, 11/5/21, 11/7/23      Influenza Seasonal Inj Age 3+10/10/2018, 9/30/22 , 10/24/23      Pneumococcal-13 Vac Conjugate1/5/2017, 11/13/18       Plliwripd33/6/2015, 9/29/20       Shingrix 8/9/23, 10/24/23      TD Adult11/1/2005       Tdap (Age 7+)8/20/2012, 12/13/22      Cscope - 9/21 (repeat 6 mo); 2/5/22 (3yrs)       Mammogram 11/18, 12/3/19, 2/12/21, 2/16/22; 3/23      BMD 12/19; 2/16/22      s/p TAHBSO      Hep C Ab 10/6/15 (-)      4/22 CT abd: No AAA      12/13/22: Current 10-Year ASCVD Risk: 8.41% - Intermediate Risk         Current Assessment & Plan     Annual Wellness exam completed   Preventive Health History reviewed  Ordered:   Labs    Mammogram   BMD   PCV 20 and RSV at pharmacy         Relevant Medications    pneumoc 20-tatyana conj-dip cr,PF, (Prevnar) 0.5 mL vaccine       Medium    Advanced directives, counseling/discussion    Overview     1/9/24: Mick Rivera (son) will be her HCPOA. Rhoda Soto (granddaughter)  Advised to fill out her HCPOA and LW forms  FULL CODE         Anemia    Overview     12/22 Iron St 18% (low), likely due to decreased intake  On iron 3x/week  Also CKD (ACD)         Relevant Orders    Vitamin B12    Iron and TIBC    Ferritin    Anxiety    Overview     Insurance will no long cover Restoril  Xanax started 10/2/23  On Zoloft in past         Relevant Orders    TSH with reflex to Free T4 if abnormal    B12 deficiency    Overview     7/5/22: 248  on supplement  monitor         Relevant Orders    Vitamin B12    Cardiac risk counseling    Overview     1/9/24:  Current 10-Year ASCVD Risk: 7.2% - Intermediate Risk    No ASA based on current guidelines         Chronic constipation    Overview     On colace         Relevant Medications    docusate sodium (Colace) 100 mg capsule    CKD (chronic kidney disease) stage 3, GFR 30-59 ml/min (CMS/[facility])    Overview     On ARB  Stable since 2019  Dr. Pope q6m  3/22 Cr 1.31, GFR 44  7/23 Cr 1.24, GFR 46         Relevant Medications    candesartan-hydrochlorothiazid (Atacand HCT) 16-12.5 mg tablet    Other Relevant Orders    Comprehensive Metabolic Panel    CBC and Auto Differential    Lipid Panel    Urinalysis with Reflex Culture and Microscopic    BI mammo bilateral screening tomosynthesis    XR DEXA bone density    Class 3 severe obesity due to excess  calories with serious comorbidity and body mass index (BMI) of 40.0 to 44.9 in adult (CMS/[facility])    Overview     BMI was above normal measurement. Current weight: 117 kg (258 lb)  Weight change since last visit (-) denotes wt loss 1 lbs   Weight loss needed to achieve BMI 25: 110.4 Lbs  Weight loss needed to achieve BMI 30: 80.9 Lbs  walks daily         Encounter for screening mammogram for breast cancer    Relevant Orders    BI mammo bilateral screening tomosynthesis    Essential hypertension, benign    Overview      Goal <130/80      On ARB/HCTZ      10-/20: ours 150/78 hr 98            hers: 139/86 hr 87      Stable      Monitor         Relevant Medications    candesartan-hydrochlorothiazid (Atacand HCT) 16-12.5 mg tablet    Other Relevant Orders    Lipid Panel    Major depressive disorder, single episode, moderate (CMS/[facility])    Overview     On zoloft in past  Currently not medicated  Stable  Moitor         RESOLVED: Medication management    Overview     OARRS, CSA, UDS reviewed, CSV done         Menopause    Relevant Orders    XR DEXA bone density    Non-seasonal allergic rhinitis    Overview     Uses zyrtec and flonase prn          Relevant Medications    cetirizine (ZyrTEC) 10 mg tablet    Screening for multiple conditions    Overview     Depression screen done          Vitamin D deficiency    Overview     Goal 30-40 (hx kidney stones)      on 50K monthly         Relevant Orders    Vitamin D 25-Hydroxy,Total (for eval of Vitamin D levels)     Other Visit Diagnoses       Need for RSV vaccination        Relevant Medications    respiratory syncytial virus, RSV, vaccine, adjuvanted, age 65y+ (AREXVY) 120 mcg/0.5 mL suspension for reconstitution          No concerns  Not taking xanax they didn't fill     Saw Toshia in 8/23 (copied):  Provider Impressions     #1 chronic kidney disease stage III. Last creatinine level is 1.24. Stable kidney function. Blood pressure is under control. Volume status is good. Normal  "potassium and bicarb level. Normal phosphorus level.      #2 hypertension. Blood pressure is under control. Continue the current medications.     I will see her in about 6 months for follow-up     ROS otherwise negative aside from what was mentioned above in HPI.    Vitals  /77   Pulse 83   Temp 36.5 °C (97.7 °F) (Temporal)   Ht 1.638 m (5' 4.5\")   Wt 117 kg (258 lb)   SpO2 95%   BMI 43.60 kg/m²   Body mass index is 43.6 kg/m².  Physical Exam  Gen: Alert, NAD  HEENT:  Unremarkable  Neck:  No CT  Respiratory:  Lungs CTAB  Cardiovascular:  Heart RRR  Neuro:  Gross motor and sensory intact  Skin:  No suspicious lesions present  Breast: No masses, or axillary lymphadenopathy      Patient Care Team:  Lillie Dorantes MD as PCP - General  Lillie Dorantes MD as PCP - [facility] [employer]vantage PCP  Lopez Pope MD as Consulting Physician (Nephrology)  Gloria Duggan DPM as Referring Physician (Podiatry)       Activities of Daily Living  In your present state of health, do you have any difficulty performing the following activities?:   Preparing food and eating?: No  Bathing yourself: No  Getting dressed: No  Using the toilet:No  Moving around from place to place: No  In the past year have you fallen or had a near fall?:Yes  Able to manage finances independently: Yes  Able to perform grocery shopping: Yes  Able to manage medications independently: Yes  Able to do housework independently: Yes  Patient self-assessment of health status? Good    Current exercise habits: walking   Dietary issues discussed: Yes  Hearing difficulties: No  Safe in current home environment: Yes  Visual Acuity assessed: Yes  Cognitive Impairment No  Allergies and Medications  Iodinated contrast media, Allegra [fexofenadine], Azelastine, Clindamycin, Codeine, Compazine [prochlorperazine], Lorazepam, Sudafed [pseudoephedrine hcl], and Amoxicillin  Current Outpatient Medications   Medication Instructions    acetaminophen (TYLENOL) " 1,000 mg, oral, Every 8 hours PRN    albuterol 90 mcg/actuation inhaler 2 puffs, inhalation, Every 4 hours PRN, Every 4-6 hours as needed    candesartan-hydrochlorothiazid (Atacand HCT) 16-12.5 mg tablet 1 tablet, oral, Daily    cetirizine (ZYRTEC) 10 mg, oral, Daily    cholecalciferol (VITAMIN D-3) 25 mcg, oral, Every other day, WITH FOOD    clindamycin (Cleocin T) 1 % gel Topical, As needed    cyanocobalamin (VITAMIN B-12) 100 mcg, oral, 3 times weekly    docusate sodium (COLACE) 100 mg, oral, 2 times daily    ergocalciferol (Vitamin D-2) 1.25 MG (60334 UT) capsule TAKE 1 CAPSULE ONCE WEEKLY X3 EVERY MONTH.    ferrous sulfate 65 mg, oral, 3 times weekly, Do not crush, chew, or split.     fluticasone (Flonase) 50 mcg/actuation nasal spray 2 sprays, Each Nostril, Daily    omeprazole (PRILOSEC) 20 mg, oral, Daily before breakfast    pneumoc 20-tatyana conj-dip cr,PF, (Prevnar) 0.5 mL vaccine 0.5 mL, intramuscular, Once    respiratory syncytial virus, RSV, vaccine, adjuvanted, age 65y+ (AREXVY) 120 mcg/0.5 mL suspension for reconstitution 0.5 mL, intramuscular, Once    simethicone (MYLICON) 80 mg, oral, Daily, Before meals    sucralfate (CARAFATE) 1 g, oral, 4 times daily before meals and nightly    SUMAtriptan (IMITREX) 50 mg, oral, Once as needed, May repeat dose once in 2 hours if no relief.  Do not exceed 2 doses in 24 hours.    ubrogepant (UBRELVY) 100 mg, oral, As needed, Use as needed for HA, may repeat x1 in 2 hours       Medications and Supplements  prescribed by me and other practitioners or clinical pharmacist (such as prescriptions, OTC's, herbal therapies and supplements) were reviewed and documented in the medical record.      Active Problem List  Patient Active Problem List   Diagnosis    Acquired cyst of kidney    Kidney stones    Bladder polyp    Cholelithiasis    Chronic constipation    Chronic GERD    CKD (chronic kidney disease) stage 3, GFR 30-59 ml/min (CMS/HCC)    Diastolic dysfunction, left  ventricle    Diverticulosis of colon    Fatty liver    Hepatic cyst    Grade I hemorrhoids    Hiatal hernia    Knee osteoarthritis    Lichen planus    B12 deficiency    Chronic sinusitis    Fat necrosis    Vitamin D deficiency    Anemia    Primary insomnia    Anxiety    Routine adult health maintenance    Combined forms of age-related cataract of both eyes    Essential hypertension, benign    H/O gastritis    Eczema    Hidradenitis    History of colonic polyps    History of shingles    Hypermagnesemia    Hyperopia of both eyes with astigmatism and presbyopia    Migraine without aura and without status migrainosus, not intractable    Non-seasonal allergic rhinitis    Abnormal screening cardiac CT    Cardiac risk counseling    Menopause    Screening for multiple conditions    Major depressive disorder, single episode, moderate (CMS/[facility])    Advanced directives, counseling/discussion    Class 3 severe obesity due to excess calories with serious comorbidity and body mass index (BMI) of 40.0 to 44.9 in adult (CMS/[facility])    Encounter for screening mammogram for breast cancer       Comprehensive Medical/Surgical/Social/Family History  Past Medical History:   Diagnosis Date    Abnormal screening cardiac CT     0/20: IMPRESSION:     1. Coronary artery calcium score of 26*. (LAD, LCX)     2. FUENTES 66th percentile** for age, gender, and race in asymptomaticpatients.     *Coronary Artery Agatston score     Score risk     Very low 1-99    H/O abdominal ultrasound     2011: 2 cm gallstone. Fatty liver with focal fatty sparring as described.    H/O bone density study     2/16/22: normal     2015 (-)     11/17: normal     12/19: normal    H/O CT scan of abdomen     4/22:2.8 cm left renal cyst.           Vasculature: Abdominal aorta is normal in caliber.           GI tract: No bowel obstruction. Colonic diverticulosis without acute      diverticulitis. Small hiatal hernia.    H/O CT scan of abdomen 04/2021 1/2. 29 x 26 mm rounded  hypodensity posteriorly L kidney most likely cyst. Small cyst versus hemangioma superiorly segment 2 of the liver measuring 10 mm diameter. Hyperdense material along peritoneal side umbilicus. Suspect previous umbilical herniorrhaphy. correlate with surgical history. If patient has not had previous surgery, main differential soft tissue neoplasm such as desmoid tumor.    H/O CT scan of abdomen 04/2021    2 calcified nodules in the deep subcutaneous fat anterolaterally in the right flank as     described. These could be fat necrosis or sequela from remote trauma or medicinal     injections. DJD in the spine and pelvis as described    H/O diagnostic mammography     11/5/2018: bilat digital screening mammo neg    H/O diagnostic ultrasound 05/09/2019 12/21: US kidney: RIGHT KIDNEY:Subcentimeter right renal cysts.     LEFT KIDNEY contains a slightly lobulated midpole left renal cyst measuring 26 mm     in greatest diameter.     2013 Us kidney: Bilateral complex cystic masses very similar to the CT from 12/04/2011    H/O diagnostic ultrasound     2/18: renal cysts bilateral: Simple parapelvic interpolar 1.6 x 1.1 x 1.1 cm      cyst, previously complicated. Exophytic 5 x 5 x 5 mm cyst     -Renal Lesion: Upper pole anechoic simple 3 x 2.4 x 2.6 cm cyst      (previously 2.5 x 2.3 x 2.1 cm by my measurements )    H/O upper GI x-ray series     2014: Prominent gastroesophageal reflux with change in patient's position.    Other conditions influencing health status     Diagnostic study result within normal limits    Other conditions influencing health status     History of normal mammogram    Personal history of medical treatment     2011 GES: normal     Past Surgical History:   Procedure Laterality Date    APPENDECTOMY      Appendectomy    CARPAL TUNNEL RELEASE      Carpal tunnel surgery    CATARACT EXTRACTION      Cataract surgery    CHOLECYSTECTOMY      Cholecystectomy    COLONOSCOPY  03/08/2022 2/5/22: 4mm polyp (TA),  diverticulosis     9/15/21; 36mm polyp, diverticulosis    COLONOSCOPY  2018.:One diminutive polyp in the cecum, removed with a cold               biopsy forceps. Resected and retrieved.               - One diminutive polyp in the descending colon, removed               with a cold biopsy forceps. Resected and retrieved.               - Three diminutive polyps in the sigmoid colon, removed               with a cold biopsy forceps. Resected and retrieved.    COLONOSCOPY  2018. - Moderate diverticulosis in the sigmoid colon and in the               ascending colon.               - Small non-bleeding external hemorrhoids     (5yrs)    COLONOSCOPY      : Sigmoid colon polyp (f/u 3-4 years); normal, 5years; diverticulosis, tumor     appendiceal orifice; Polyp x 2, abnormal mucosa in the ileo-cecal valve, diverticulum in     the sigmoid colon, internal hemmorhoids    CYSTOSCOPY  2019: The scope was negotiated per urethra with evidence of a benign polyps into the     bladder    ESOPHAGOGASTRODUODENOSCOPY      2021: (-)      (-)     10/4/19: Normal    HERNIA REPAIR      Hernia repair    SHOULDER ARTHROSCOPY      Shoulder surgery    TOTAL ABDOMINAL HYSTERECTOMY W/ BILATERAL SALPINGOOPHORECTOMY      Total hysterectomy with removal of both tubes and ovaries    VARICOSE VEIN SURGERY      Varicose vein ligation     Social History     Social History Narrative        M:Breast CA, age 60s, CAD, HTN, Osteoporosis ( age 77)        F: DM, HTN, 'Heart', DVT, COPD ( age 87)        B: Overdose ()        S: HTN        S: Hernia, HTN        D:  in  (Suicide)        (M)Aunt: Breast CA, Alzheimer's ()        (M)Aunt: Pancreatic CA ()        Social History     · Single, 2 kids        Not working     · Caffeine use      · Consumes alcohol occasionally     · Former smoker: · Quit in 1970s,  Smoked for 7 years, <1pack/week,  quit 40 yeats ago     Tobacco/Alcohol/Opioid  use, as well as Illicit Drug Use was screened for/reviewed and documented in Social Documentation section of the chart and medication list as appropriate    Depression Screening  Depression screening (15m) completed using the PHQ-2 questions with results documented in the chart/encounter  (Rooming Screening section for documentation, or note for additional information)    Cardiac Risk Assessment  Cardiovascular risk was discussed and, if needed, lifestyle modifications recommended, including nutritional choices, exercise, and elimination of habits contributing to risk.   We agreed on a plan to reduce the current cardiovascular risk based on above discussion as needed.     Aspirin use/disuse was discussed and documented in the Problem List of the medical record (under Cardiac Risk Counseling) after reviewing the updated guidelines below:  Consider low dose Aspirin ( mg) use if the benefit for cardiovascular disease prevention outweighs risk for bleeding complications.   In general, low dose ASA should be considered:  In patients WITHOUT prior MI/stroke/PAD (primary prevention):   a. Age <60: Use if 10-year cardiovascular disease risk >20%, with discussion of risks and benefits with patient  b. Age 60-<70: Use if 10-year cardiovascular disease risk >20% and low bleeding (e.g., gastrointestinal) risk, with discussion of risks and benefits with patient  c. Age >=70: Do not use    In patients WITH prior MI/stroke/PAD (secondary prevention):   Generally use unless extremely high bleeding (e.g., gastrointenstinal) risk, with discussion of risks and benefits with patient    Advance Directives Discussion  Advanced Care Planning (including a Living Will, Healthcare POA, as well as specific end of life choices and/or directives), was discussed with the patient and/or surrogate, voluntarily, and details of that discussion documented in the Problem List (under Advanced Directives Discussion) of the medical record.    (~16  min spent discussing above)     During the course of the visit the patient was educated and counseled about age appropriate screening and preventive services. Completed preventive screenings were documented in the chart and orders were placed for outstanding screenings/procedures as documented in the Assessment and Plan.    Patient Instructions (the written plan) was given to the patient at check out.

## 2024-01-09 NOTE — PATIENT INSTRUCTIONS
BMI was above normal measurement. Current weight: 117 kg (258 lb)  Weight change since last visit (-) denotes wt loss 1 lbs   Weight loss needed to achieve BMI 25: 110.4 Lbs  Weight loss needed to achieve BMI 30: 80.9 Lbs  Advised to Increase physical activity

## 2024-02-01 ENCOUNTER — LAB (OUTPATIENT)
Dept: LAB | Facility: LAB | Age: 74
End: 2024-02-01
Payer: COMMERCIAL

## 2024-02-01 DIAGNOSIS — N18.30 CHRONIC KIDNEY DISEASE, STAGE 3 UNSPECIFIED (MULTI): Primary | ICD-10-CM

## 2024-02-01 DIAGNOSIS — E21.3 HYPERPARATHYROIDISM (MULTI): ICD-10-CM

## 2024-02-01 LAB — PTH-INTACT SERPL-MCNC: 133.6 PG/ML (ref 18.5–88)

## 2024-02-01 PROCEDURE — 83970 ASSAY OF PARATHORMONE: CPT

## 2024-02-01 PROCEDURE — 36415 COLL VENOUS BLD VENIPUNCTURE: CPT

## 2024-02-01 PROCEDURE — 84100 ASSAY OF PHOSPHORUS: CPT

## 2024-02-01 PROCEDURE — 80048 BASIC METABOLIC PNL TOTAL CA: CPT

## 2024-02-01 PROCEDURE — 82306 VITAMIN D 25 HYDROXY: CPT

## 2024-02-02 LAB
25(OH)D3 SERPL-MCNC: 44 NG/ML (ref 30–100)
ANION GAP SERPL CALC-SCNC: 16 MMOL/L (ref 10–20)
BUN SERPL-MCNC: 23 MG/DL (ref 6–23)
CALCIUM SERPL-MCNC: 9.5 MG/DL (ref 8.6–10.3)
CHLORIDE SERPL-SCNC: 102 MMOL/L (ref 98–107)
CO2 SERPL-SCNC: 26 MMOL/L (ref 21–32)
CREAT SERPL-MCNC: 1.62 MG/DL (ref 0.5–1.05)
EGFRCR SERPLBLD CKD-EPI 2021: 34 ML/MIN/1.73M*2
GLUCOSE SERPL-MCNC: 97 MG/DL (ref 74–99)
PHOSPHATE SERPL-MCNC: 3.8 MG/DL (ref 2.5–4.9)
POTASSIUM SERPL-SCNC: 3.8 MMOL/L (ref 3.5–5.3)
SODIUM SERPL-SCNC: 140 MMOL/L (ref 136–145)

## 2024-02-02 RX ORDER — CALCITRIOL 0.25 UG/1
0.25 CAPSULE ORAL EVERY OTHER DAY
Qty: 45 CAPSULE | Refills: 3 | Status: SHIPPED | OUTPATIENT
Start: 2024-02-02 | End: 2025-02-01

## 2024-02-06 ENCOUNTER — OFFICE VISIT (OUTPATIENT)
Dept: NEPHROLOGY | Facility: CLINIC | Age: 74
End: 2024-02-06
Payer: COMMERCIAL

## 2024-02-06 VITALS
BODY MASS INDEX: 42.99 KG/M2 | WEIGHT: 258 LBS | HEIGHT: 65 IN | HEART RATE: 71 BPM | SYSTOLIC BLOOD PRESSURE: 124 MMHG | DIASTOLIC BLOOD PRESSURE: 78 MMHG

## 2024-02-06 DIAGNOSIS — N18.32 STAGE 3B CHRONIC KIDNEY DISEASE (MULTI): Primary | ICD-10-CM

## 2024-02-06 DIAGNOSIS — I10 ESSENTIAL HYPERTENSION: ICD-10-CM

## 2024-02-06 DIAGNOSIS — E21.3 HYPERPARATHYROIDISM (MULTI): ICD-10-CM

## 2024-02-06 PROCEDURE — 3008F BODY MASS INDEX DOCD: CPT | Performed by: INTERNAL MEDICINE

## 2024-02-06 PROCEDURE — 3078F DIAST BP <80 MM HG: CPT | Performed by: INTERNAL MEDICINE

## 2024-02-06 PROCEDURE — 99214 OFFICE O/P EST MOD 30 MIN: CPT | Performed by: INTERNAL MEDICINE

## 2024-02-06 PROCEDURE — 1036F TOBACCO NON-USER: CPT | Performed by: INTERNAL MEDICINE

## 2024-02-06 PROCEDURE — 3074F SYST BP LT 130 MM HG: CPT | Performed by: INTERNAL MEDICINE

## 2024-02-06 PROCEDURE — 1159F MED LIST DOCD IN RCRD: CPT | Performed by: INTERNAL MEDICINE

## 2024-02-06 PROCEDURE — 1160F RVW MEDS BY RX/DR IN RCRD: CPT | Performed by: INTERNAL MEDICINE

## 2024-02-06 NOTE — PROGRESS NOTES
Janell Miguel   73 y.o.    @@  [facility]/Room: [id number]/Room/bed info not found    Subjective:   The patient is being seen for a routine clinic follow-up of chronic kidney disease. Recently, the disease has been stable. Disease complications:  No hyperkalemia, no hypocalcemia, no hyperphosphatemia, no metabolic acidosis, no coagulopathy, no uremic encephalopathy, no neuropathy and no renal osteodystrophy. The patient is currently asymptomatic. No associated symptoms are reported.       Meds:   Current Outpatient Medications   Medication Sig Dispense Refill    acetaminophen (Tylenol) 500 mg tablet Take 2 tablets (1,000 mg) by mouth every 8 hours if needed.      albuterol 90 mcg/actuation inhaler Inhale 2 puffs every 4 hours if needed for shortness of breath or wheezing. Every 4-6 hours as needed 18 g 11    calcitriol (Rocaltrol) 0.25 mcg capsule Take 1 capsule (0.25 mcg) by mouth every other day. 45 capsule 3    candesartan-hydrochlorothiazid (Atacand HCT) 16-12.5 mg tablet Take 1 tablet by mouth once daily. 90 tablet 3    cetirizine (ZyrTEC) 10 mg tablet Take 1 tablet (10 mg) by mouth once daily. 30 tablet 11    cholecalciferol (Vitamin D-3) 25 MCG (1000 UT) capsule Take 1 capsule (25 mcg) by mouth every other day. WITH FOOD      clindamycin (Cleocin T) 1 % gel Apply topically if needed (skin irritation / acne). 30 g 3    cyanocobalamin (Vitamin B-12) 1,000 mcg tablet Take 100 mcg by mouth 3 (three) times a week.      docusate sodium (Colace) 100 mg capsule Take 1 capsule (100 mg) by mouth 2 times a day. 60 capsule 0    ergocalciferol (Vitamin D-2) 1.25 MG (46226 UT) capsule TAKE 1 CAPSULE ONCE WEEKLY X3 EVERY MONTH. 12 capsule 3    ferrous sulfate 325 (65 Fe) MG EC tablet Take 1 tablet (65 mg) by mouth 3 times a week. Do not crush, chew, or split.      omeprazole (PriLOSEC) 20 mg DR capsule Take 1 capsule (20 mg) by mouth once daily in the morning. Take before meals. 90 capsule 3    simethicone (Mylicon) 40 mg/0.6 mL  drops Take 1.2 mL (80 mg) by mouth once daily. Before meals      sucralfate (Carafate) 1 gram tablet Take 1 tablet (1 g) by mouth 4 times a day before meals. 360 tablet 3    SUMAtriptan (Imitrex) 50 mg tablet Take 1 tablet (50 mg) by mouth 1 time if needed for migraine. May repeat dose once in 2 hours if no relief.  Do not exceed 2 doses in 24 hours.      ubrogepant (Ubrelvy) 100 mg tablet tablet Take 1 tablet (100 mg) by mouth if needed. Use as needed for HA, may repeat x1 in 2 hours      fluticasone (Flonase) 50 mcg/actuation nasal spray ADMINISTER 2 SPRAYS INTO EACH NOSTRIL ONCE DAILY. 48 mL 1     No current facility-administered medications for this visit.          ROS:  The patient is awake and oriented. No dizziness or lightheadedness. No chills and no fever. No headaches. No nausea and no vomiting. No shortness of breath. No cough. No sputum. No chest pain. No chest tightness. No abdominal pain. No diarrhea and no constipation. No hematemesis or hemoptysis. No hematuria. No rectal bleeding. No melena. No epistaxis. No urinary symptoms. No flank pain. No leg edema. No leg pain. No weakness. No itching. Overall, the rest of the review of systems is also negative.  12 point review of systems otherwise negative as stated in HPI.        Physical Examination:        Vitals:    02/06/24 1332   BP: 124/78   Pulse: 71     General: The patient is awake, oriented, and is not in any distress.  Head and Neck: Normocephalic. No periorbital edema.  Eyes: non-icteric  Respiratory: Symmetric air entry. Symmetric chest expansion.No respiratory distress.  Cardiovascular: Symmetric peripheral pulses.  Skin: No maculopapular rash.  Abdomen: soft, nt/nd  Musculoskeletal: No peripheral edema in both left and right upper extremities.  No edema in either left or right lower extremities.  Neuro Exam: Speech is fluent. Moves extremities.    Imaging:  === 12/06/21 ===    US RENAL COMPLETE    - Impression -  Simple renal cysts. No  hydronephrosis.       Blood Labs:  No results found for this or any previous visit (from the past 24 hour(s)).   Lab Results   Component Value Date    .6 (H) 02/01/2024    PROTUR NEGATIVE 01/03/2024    PHOS 3.8 02/01/2024      Lab Results   Component Value Date    GLUCOSE 97 02/01/2024    CALCIUM 9.5 02/01/2024     02/01/2024    K 3.8 02/01/2024    CO2 26 02/01/2024     02/01/2024    BUN 23 02/01/2024    CREATININE 1.62 (H) 02/01/2024         Assessment and Plan:  #1 chronic kidney disease stage III. Last creatinine level is 1.62.  Kidney function is worse than her baseline.  No recent exposure to IV contrast.  She does not take nonsteroidal anti-inflammatory medication.  No recent diarrhea.  Basic metabolic panel will be repeated next week.    #2 hypertension. Blood pressure is under control. Continue the current medications.    3.  Secondary hyperparathyroidism.  I put her on calcitriol.     I will see her in about 4 months for follow-up           Lopez Pope MD

## 2024-02-15 ENCOUNTER — LAB (OUTPATIENT)
Dept: LAB | Facility: LAB | Age: 74
End: 2024-02-15
Payer: COMMERCIAL

## 2024-02-15 DIAGNOSIS — E21.3 HYPERPARATHYROIDISM (MULTI): ICD-10-CM

## 2024-02-15 DIAGNOSIS — I10 ESSENTIAL HYPERTENSION: ICD-10-CM

## 2024-02-15 DIAGNOSIS — N18.32 STAGE 3B CHRONIC KIDNEY DISEASE (MULTI): ICD-10-CM

## 2024-02-15 LAB
ANION GAP SERPL CALC-SCNC: 13 MMOL/L (ref 10–20)
BUN SERPL-MCNC: 21 MG/DL (ref 6–23)
CALCIUM SERPL-MCNC: 9.2 MG/DL (ref 8.6–10.3)
CHLORIDE SERPL-SCNC: 102 MMOL/L (ref 98–107)
CO2 SERPL-SCNC: 28 MMOL/L (ref 21–32)
CREAT SERPL-MCNC: 1.25 MG/DL (ref 0.5–1.05)
EGFRCR SERPLBLD CKD-EPI 2021: 46 ML/MIN/1.73M*2
GLUCOSE SERPL-MCNC: 86 MG/DL (ref 74–99)
POTASSIUM SERPL-SCNC: 3.7 MMOL/L (ref 3.5–5.3)
SODIUM SERPL-SCNC: 139 MMOL/L (ref 136–145)

## 2024-02-15 PROCEDURE — 36415 COLL VENOUS BLD VENIPUNCTURE: CPT

## 2024-02-15 PROCEDURE — 80048 BASIC METABOLIC PNL TOTAL CA: CPT

## 2024-02-16 ENCOUNTER — TELEPHONE (OUTPATIENT)
Dept: PRIMARY CARE | Facility: CLINIC | Age: 74
End: 2024-02-16
Payer: COMMERCIAL

## 2024-02-16 NOTE — TELEPHONE ENCOUNTER
----- Message from Lopez Pope MD sent at 2/16/2024 10:13 AM EST -----  Kidney function is back to baseline.

## 2024-02-28 ENCOUNTER — OFFICE VISIT (OUTPATIENT)
Dept: PRIMARY CARE | Facility: CLINIC | Age: 74
End: 2024-02-28
Payer: COMMERCIAL

## 2024-02-28 VITALS
HEART RATE: 79 BPM | TEMPERATURE: 97.3 F | HEIGHT: 65 IN | BODY MASS INDEX: 42.49 KG/M2 | OXYGEN SATURATION: 95 % | DIASTOLIC BLOOD PRESSURE: 73 MMHG | WEIGHT: 255 LBS | SYSTOLIC BLOOD PRESSURE: 108 MMHG

## 2024-02-28 DIAGNOSIS — J06.9 UPPER RESPIRATORY TRACT INFECTION, UNSPECIFIED TYPE: Primary | ICD-10-CM

## 2024-02-28 LAB
POC RAPID INFLUENZA A: NEGATIVE
POC RAPID INFLUENZA B: NEGATIVE

## 2024-02-28 PROCEDURE — 1158F ADVNC CARE PLAN TLK DOCD: CPT | Performed by: NURSE PRACTITIONER

## 2024-02-28 PROCEDURE — 3074F SYST BP LT 130 MM HG: CPT | Performed by: NURSE PRACTITIONER

## 2024-02-28 PROCEDURE — 3078F DIAST BP <80 MM HG: CPT | Performed by: NURSE PRACTITIONER

## 2024-02-28 PROCEDURE — 1036F TOBACCO NON-USER: CPT | Performed by: NURSE PRACTITIONER

## 2024-02-28 PROCEDURE — 87804 INFLUENZA ASSAY W/OPTIC: CPT | Performed by: NURSE PRACTITIONER

## 2024-02-28 PROCEDURE — 3008F BODY MASS INDEX DOCD: CPT | Performed by: NURSE PRACTITIONER

## 2024-02-28 PROCEDURE — 1123F ACP DISCUSS/DSCN MKR DOCD: CPT | Performed by: NURSE PRACTITIONER

## 2024-02-28 PROCEDURE — 99213 OFFICE O/P EST LOW 20 MIN: CPT | Performed by: NURSE PRACTITIONER

## 2024-02-28 PROCEDURE — 1160F RVW MEDS BY RX/DR IN RCRD: CPT | Performed by: NURSE PRACTITIONER

## 2024-02-28 PROCEDURE — 87636 SARSCOV2 & INF A&B AMP PRB: CPT

## 2024-02-28 PROCEDURE — 1159F MED LIST DOCD IN RCRD: CPT | Performed by: NURSE PRACTITIONER

## 2024-02-28 NOTE — PATIENT INSTRUCTIONS
Plain Delsym(dextromethorphan) is what you can take for cough  Tylenol for HA  Flonase for post nasal drip (can also help with cough) at most twice daily

## 2024-02-28 NOTE — PROGRESS NOTES
Subjective   Patient ID: Janell Rivera is a 73 y.o. female who presents for Sick Visit.    Sx started 2/26/24  Current sx coughing,   HA,   tickle in the throat,   sore throat,   raspy voice,   nasal congestion  Taking tylenol  No SOB or wheeze  No CP  Chills  No body aches  Runny nose  Requesting testing for flu and covid        Review of Systems  ROS completely negative except what was mentioned in the HPI.  Problem List, surgical, social, and family histories which were reviewed and updated as necessary within the EMR. I also personally reviewed the notes, labs, and imaging that pertained to what was documented or discussed in the HPI.    Objective   Physical Exam  Vitals and nursing note reviewed.   Constitutional:       General: She is not in acute distress.     Appearance: Normal appearance.   HENT:      Head: Normocephalic and atraumatic.      Right Ear: Tympanic membrane, ear canal and external ear normal.      Left Ear: Tympanic membrane, ear canal and external ear normal.      Nose: Rhinorrhea present.      Mouth/Throat:      Mouth: Mucous membranes are moist.      Pharynx: Oropharynx is clear.   Eyes:      Extraocular Movements: Extraocular movements intact.      Conjunctiva/sclera: Conjunctivae normal.      Pupils: Pupils are equal, round, and reactive to light.   Cardiovascular:      Rate and Rhythm: Normal rate and regular rhythm.      Pulses: Normal pulses.      Heart sounds: Normal heart sounds.   Pulmonary:      Effort: Pulmonary effort is normal.      Breath sounds: Normal breath sounds.   Musculoskeletal:         General: Normal range of motion.      Cervical back: Normal range of motion and neck supple. No tenderness.   Lymphadenopathy:      Cervical: No cervical adenopathy.   Skin:     General: Skin is warm and dry.   Neurological:      General: No focal deficit present.      Mental Status: She is alert and oriented to person, place, and time. Mental status is at baseline.   Psychiatric:          "Mood and Affect: Mood normal.         Behavior: Behavior normal.         Thought Content: Thought content normal.         Judgment: Judgment normal.         /73 (BP Location: Left arm)   Pulse 79   Temp 36.3 °C (97.3 °F) (Temporal)   Ht 1.638 m (5' 4.5\")   Wt 116 kg (255 lb)   SpO2 95%   BMI 43.09 kg/m²     Assessment/Plan    Problem List Items Addressed This Visit    None  Visit Diagnoses       Upper respiratory tract infection, unspecified type    -  Primary    sx onset 2/26; interested in antivirals if covid or flu.  IO flu neg.  PCR sent    Relevant Orders    POCT Influenza A/B manually resulted (Completed)    Sars-CoV-2 and Influenza A/B PCR           "

## 2024-02-29 LAB
FLUAV RNA RESP QL NAA+PROBE: NOT DETECTED
FLUBV RNA RESP QL NAA+PROBE: NOT DETECTED
SARS-COV-2 RNA RESP QL NAA+PROBE: NOT DETECTED

## 2024-04-01 ENCOUNTER — HOSPITAL ENCOUNTER (OUTPATIENT)
Dept: RADIOLOGY | Facility: EXTERNAL LOCATION | Age: 74
Discharge: HOME | End: 2024-04-01

## 2024-04-01 ENCOUNTER — OFFICE VISIT (OUTPATIENT)
Dept: PRIMARY CARE | Facility: CLINIC | Age: 74
End: 2024-04-01
Payer: COMMERCIAL

## 2024-04-01 VITALS
HEIGHT: 64 IN | DIASTOLIC BLOOD PRESSURE: 58 MMHG | OXYGEN SATURATION: 96 % | TEMPERATURE: 98.4 F | BODY MASS INDEX: 43.28 KG/M2 | HEART RATE: 84 BPM | WEIGHT: 253.5 LBS | SYSTOLIC BLOOD PRESSURE: 96 MMHG

## 2024-04-01 DIAGNOSIS — Z98.890 H/O VENTRAL HERNIA REPAIR: ICD-10-CM

## 2024-04-01 DIAGNOSIS — K44.9 HIATAL HERNIA: ICD-10-CM

## 2024-04-01 DIAGNOSIS — E55.9 VITAMIN D DEFICIENCY: ICD-10-CM

## 2024-04-01 DIAGNOSIS — R10.10 PAIN OF UPPER ABDOMEN: ICD-10-CM

## 2024-04-01 DIAGNOSIS — N18.31 STAGE 3A CHRONIC KIDNEY DISEASE (MULTI): ICD-10-CM

## 2024-04-01 DIAGNOSIS — Z87.19 H/O VENTRAL HERNIA REPAIR: ICD-10-CM

## 2024-04-01 DIAGNOSIS — Z87.19 H/O GASTRITIS: ICD-10-CM

## 2024-04-01 DIAGNOSIS — R10.10 PAIN OF UPPER ABDOMEN: Primary | ICD-10-CM

## 2024-04-01 DIAGNOSIS — K21.9 CHRONIC GERD: ICD-10-CM

## 2024-04-01 PROCEDURE — G2211 COMPLEX E/M VISIT ADD ON: HCPCS | Performed by: INTERNAL MEDICINE

## 2024-04-01 PROCEDURE — 1123F ACP DISCUSS/DSCN MKR DOCD: CPT | Performed by: INTERNAL MEDICINE

## 2024-04-01 PROCEDURE — 99214 OFFICE O/P EST MOD 30 MIN: CPT | Performed by: INTERNAL MEDICINE

## 2024-04-01 PROCEDURE — 3008F BODY MASS INDEX DOCD: CPT | Performed by: INTERNAL MEDICINE

## 2024-04-01 PROCEDURE — 3074F SYST BP LT 130 MM HG: CPT | Performed by: INTERNAL MEDICINE

## 2024-04-01 PROCEDURE — 1160F RVW MEDS BY RX/DR IN RCRD: CPT | Performed by: INTERNAL MEDICINE

## 2024-04-01 PROCEDURE — 1036F TOBACCO NON-USER: CPT | Performed by: INTERNAL MEDICINE

## 2024-04-01 PROCEDURE — 1159F MED LIST DOCD IN RCRD: CPT | Performed by: INTERNAL MEDICINE

## 2024-04-01 PROCEDURE — 3078F DIAST BP <80 MM HG: CPT | Performed by: INTERNAL MEDICINE

## 2024-04-01 PROCEDURE — 1158F ADVNC CARE PLAN TLK DOCD: CPT | Performed by: INTERNAL MEDICINE

## 2024-04-01 RX ORDER — FAMOTIDINE 20 MG/1
20 TABLET, FILM COATED ORAL 2 TIMES DAILY
Qty: 60 TABLET | Refills: 5 | Status: SHIPPED | OUTPATIENT
Start: 2024-04-01 | End: 2024-09-28

## 2024-04-01 NOTE — PROGRESS NOTES
CC/HPI:   Hernia (Giving issues- can not eat because it hurts).  Feels it is pushing into her stomach  Can eat OK  No pain right now  At nighttime gets pain at 0300  Feels better after BM  No N/V  No daytime symptoms  Eats yogurt and banana for BF, no symptoms after  Eats dinner at 6pm, goes to bed at 10pm,   UGI  2014: Prominent gastroesophageal reflux with change in patient's position.   CT abd 4/22:  Small hiatal hernia. Prior ventral abdominal wall hernia repair.   On Colace  Not taking Prilosec, uses prn  Having BM, passing gas    Assessment and Plan:  Problem List Items Addressed This Visit          Medium    Chronic GERD    Overview     Uses PPI and carafate prn          CKD (chronic kidney disease) stage 3, GFR 30-59 ml/min (CMS/[facility])    Overview     On ARB  Stable since 2019  Dr. Pope q6m  3/22 Cr 1.31, GFR 44  7/23 Cr 1.24, GFR 46         Relevant Orders    Referral to Clinical Pharmacy    CT abdomen pelvis wo IV contrast (Completed)    H/O gastritis    Relevant Medications    famotidine (Pepcid) 20 mg tablet    Other Relevant Orders    CT abdomen pelvis wo IV contrast (Completed)    Hiatal hernia    Relevant Orders    CT abdomen pelvis wo IV contrast (Completed)    Vitamin D deficiency    Overview     Goal 30-40 (hx kidney stones)  on 50K monthly In past  3/1/24: level was 44         Current Assessment & Plan     Stop 50K weekly  Contonue PO 1K daily          Other Visit Diagnoses       Pain of upper abdomen    -  Primary    ? GERD vs gastritis vs recurrent hernia  Wo;; start H2B BID (avopiding PPI due to CKD)  CT abd  f/u GENS    Relevant Orders    Referral to General Surgery    CT abdomen pelvis wo IV contrast (Completed)    H/O ventral hernia repair        Relevant Orders    Referral to General Surgery    CT abdomen pelvis wo IV contrast (Completed)          ROS otherwise negative aside from what was mentioned above in HPI.    Vitals  BP 96/58   Pulse 84   Temp 36.9 °C (98.4 °F)   Ht 1.626 m (5'  "4\")   Wt 115 kg (253 lb 8 oz)   SpO2 96%   BMI 43.51 kg/m²   Body mass index is 43.51 kg/m².  Physical Exam  Gen: Alert, NAD  HEENT: Unremarkable  Respiratory:  Lungs CTAB  CV: RRR  Neuro:  Gross motor and sensory intact  Abd: Soft, tender in the epigastrium and upper abdomen, slight ventral bulge with valsalva; + BS x 4    Allergies and Medications  Iodinated contrast media, Azelastine, Clindamycin, Codeine, Fexofenadine, Lorazepam, Prochlorperazine, Sudafed [pseudoephedrine hcl], and Amoxicillin  Current Outpatient Medications   Medication Instructions    acetaminophen (TYLENOL) 1,000 mg, oral, Every 8 hours PRN    albuterol 90 mcg/actuation inhaler 2 puffs, inhalation, Every 4 hours PRN, Every 4-6 hours as needed    calcitriol (ROCALTROL) 0.25 mcg, oral, Every other day    candesartan-hydrochlorothiazid (Atacand HCT) 16-12.5 mg tablet 1 tablet, oral, Daily    cetirizine (ZYRTEC) 10 mg, oral, Daily    cholecalciferol (VITAMIN D-3) 25 mcg, oral, Every other day, WITH FOOD    clindamycin (Cleocin T) 1 % gel Topical, As needed    cyanocobalamin (VITAMIN B-12) 100 mcg, oral, 3 times weekly    docusate sodium (COLACE) 100 mg, oral, 2 times daily    famotidine (PEPCID) 20 mg, oral, 2 times daily    ferrous sulfate 65 mg, oral, 3 times weekly, Do not crush, chew, or split.     fluticasone (Flonase) 50 mcg/actuation nasal spray 2 sprays, Each Nostril, Daily    simethicone (MYLICON) 80 mg, oral, Daily, Before meals    sucralfate (CARAFATE) 1 g, oral, 4 times daily before meals and nightly    SUMAtriptan (IMITREX) 50 mg, oral, Once as needed, May repeat dose once in 2 hours if no relief.  Do not exceed 2 doses in 24 hours.    ubrogepant (UBRELVY) 100 mg, oral, As needed, Use as needed for HA, may repeat x1 in 2 hours           "

## 2024-04-10 ENCOUNTER — TELEMEDICINE (OUTPATIENT)
Dept: PHARMACY | Facility: HOSPITAL | Age: 74
End: 2024-04-10
Payer: COMMERCIAL

## 2024-04-10 DIAGNOSIS — N18.31 STAGE 3A CHRONIC KIDNEY DISEASE (MULTI): Primary | ICD-10-CM

## 2024-04-10 NOTE — PROGRESS NOTES
Patient ID: Janell Rivera is a 73 y.o. female who presents for Chronic Kidney Disease.    Referring Provider: Lillie Dorantes MD  PCP: Lillie Dorantes MD Last visit with PCP: 2024; Next visit with PCP: 2024      Subjective   Treatment Adherence:   Patient did take medications today.   Number of missed doses in last 7 days: 0.      Preferred pharmacy: [facility] in [place]  Can patient afford prescribed medications: Yes,      HPI  CHRONIC KIDNEY DISEASE  Stage: 3a  Last eGFR: 46mL/min/m2  Last Scr: 1.25mg/dL    Does patient follow with nephrology? Yes - Dr. Lopez Pope    Any renally adjusted medications in medication list?   Famotidine: CrCl 30-60mL/min; 20mg daily or 40mg every other day    HTN history:  HTN diagnosis: yes  Current Regimen  Candesartan/hydrochlorothiazide 16/12.5mg daily  BP Cuff at home? yes  HTN at goal? yes    HLD history:  Diagnosis? yes  Current Regimen  N/a  At goal? no  Current LDL: 133mg/dL  Current Tmg/dL    DM history:  Diagnosis? no    Objective     There were no vitals taken for this visit.   BP Readings from Last 4 Encounters:   24 96/58   24 108/73   24 124/78   24 117/77      There were no vitals filed for this visit.     Labs  Lab Results   Component Value Date    BILITOT 0.6 2024    CALCIUM 9.2 02/15/2024    CO2 28 02/15/2024     02/15/2024    CREATININE 1.25 (H) 02/15/2024    GLUCOSE 86 02/15/2024    ALKPHOS 66 2024    K 3.7 02/15/2024    PROT 7.1 2024     02/15/2024    AST 25 2024    ALT 28 2024    BUN 21 02/15/2024    ANIONGAP 13 02/15/2024    MG 2.20 2024    PHOS 3.8 2024     2022    ALBUMIN 4.1 2024    GFRF 46 (A) 2023     Lab Results   Component Value Date    TRIG 133 2024    CHOL 202 (H) 2024    LDLCALC 133 (H) 2024    HDL 42.2 2024     Lab Results   Component Value Date    HGBA1C 5.8 2019     Current Outpatient Medications    Medication Instructions    acetaminophen (TYLENOL) 1,000 mg, oral, Every 8 hours PRN    albuterol 90 mcg/actuation inhaler 2 puffs, inhalation, Every 4 hours PRN, Every 4-6 hours as needed    calcitriol (ROCALTROL) 0.25 mcg, oral, Every other day    candesartan-hydrochlorothiazid (Atacand HCT) 16-12.5 mg tablet 1 tablet, oral, Daily    cetirizine (ZYRTEC) 10 mg, oral, Daily    cholecalciferol (VITAMIN D-3) 25 mcg, oral, Every other day, WITH FOOD    clindamycin (Cleocin T) 1 % gel Topical, As needed    cyanocobalamin (VITAMIN B-12) 100 mcg, oral, 3 times weekly    docusate sodium (COLACE) 100 mg, oral, 2 times daily    famotidine (PEPCID) 20 mg, oral, 2 times daily    ferrous sulfate 65 mg, oral, 3 times weekly, Do not crush, chew, or split.     fluticasone (Flonase) 50 mcg/actuation nasal spray 2 sprays, Each Nostril, Daily    sucralfate (CARAFATE) 1 g, oral, 4 times daily before meals and nightly    ubrogepant (UBRELVY) 100 mg, oral, As needed, Use as needed for HA, may repeat x1 in 2 hours       Drug Interactions;  None at time of review    Assessment/Plan   Problem List Items Addressed This Visit             ICD-10-CM    CKD (chronic kidney disease) stage 3, GFR 30-59 ml/min (CMS/[facility]) - Primary N18.30     Patients CKD is stable, as evidenced by the most recent eGFR of 46mL/min/m2 on 2/15/2024.    Patients lipids are uncontrolled and may benefit from the addition of Atorvastatin 20mg daily.    Patient checks her BP at home every once in awhile, and reports well controlled.    Patient may also benefit from the addition of SGLT2 for CKD.    Continue working towards healthy diet and lifestyle.          Follow up with Clinical Pharmacy Team as needed by the patient or PCP.    Continue all meds under the continuation of care with the referring provider and clinical pharmacy team.    Please reach out to the Clinical Pharmacy Team if there are any further questions.     Verbal consent to manage patient's drug therapy  was obtained from patient. They were informed they may decline to participate or withdraw from participation in pharmacy services at any time.    Tahira Alicia, PharmD  [phone]

## 2024-04-10 NOTE — ASSESSMENT & PLAN NOTE
Patients CKD is stable, as evidenced by the most recent eGFR of 46mL/min/m2 on 2/15/2024.    Patients lipids are uncontrolled and may benefit from the addition of Atorvastatin 20mg daily.    Patient checks her BP at home every once in awhile, and reports well controlled.    Patient may also benefit from the addition of SGLT2 for CKD.    Continue working towards healthy diet and lifestyle.

## 2024-04-11 ENCOUNTER — TELEPHONE (OUTPATIENT)
Dept: PRIMARY CARE | Facility: CLINIC | Age: 74
End: 2024-04-11
Payer: COMMERCIAL

## 2024-04-11 NOTE — TELEPHONE ENCOUNTER
Left VM for patient relaying results and to keep General Surgery appointment for further evaluation.

## 2024-04-12 NOTE — TELEPHONE ENCOUNTER
Spoke to patient.  Relayed message to keep appointment with General Surgery.  Patient verbally understood.

## 2024-05-10 ENCOUNTER — TELEPHONE (OUTPATIENT)
Dept: PRIMARY CARE | Facility: CLINIC | Age: 74
End: 2024-05-10
Payer: COMMERCIAL

## 2024-05-30 ENCOUNTER — LAB (OUTPATIENT)
Dept: LAB | Facility: LAB | Age: 74
End: 2024-05-30
Payer: COMMERCIAL

## 2024-05-30 DIAGNOSIS — N18.30 CHRONIC KIDNEY DISEASE, STAGE 3 UNSPECIFIED (MULTI): ICD-10-CM

## 2024-05-30 PROCEDURE — 36415 COLL VENOUS BLD VENIPUNCTURE: CPT

## 2024-05-30 PROCEDURE — 80048 BASIC METABOLIC PNL TOTAL CA: CPT

## 2024-05-31 LAB
ANION GAP SERPL CALC-SCNC: 13 MMOL/L (ref 10–20)
BUN SERPL-MCNC: 22 MG/DL (ref 6–23)
CALCIUM SERPL-MCNC: 9.5 MG/DL (ref 8.6–10.6)
CHLORIDE SERPL-SCNC: 104 MMOL/L (ref 98–107)
CO2 SERPL-SCNC: 28 MMOL/L (ref 21–32)
CREAT SERPL-MCNC: 1.27 MG/DL (ref 0.5–1.05)
EGFRCR SERPLBLD CKD-EPI 2021: 45 ML/MIN/1.73M*2
GLUCOSE SERPL-MCNC: 71 MG/DL (ref 74–99)
POTASSIUM SERPL-SCNC: 4.2 MMOL/L (ref 3.5–5.3)
SODIUM SERPL-SCNC: 141 MMOL/L (ref 136–145)

## 2024-06-04 ENCOUNTER — TELEPHONE (OUTPATIENT)
Dept: NEPHROLOGY | Facility: CLINIC | Age: 74
End: 2024-06-04

## 2024-06-04 ENCOUNTER — OFFICE VISIT (OUTPATIENT)
Dept: NEPHROLOGY | Facility: CLINIC | Age: 74
End: 2024-06-04
Payer: COMMERCIAL

## 2024-06-04 VITALS
HEIGHT: 64 IN | HEART RATE: 71 BPM | BODY MASS INDEX: 43.36 KG/M2 | SYSTOLIC BLOOD PRESSURE: 129 MMHG | WEIGHT: 254 LBS | DIASTOLIC BLOOD PRESSURE: 79 MMHG

## 2024-06-04 DIAGNOSIS — I10 ESSENTIAL HYPERTENSION: ICD-10-CM

## 2024-06-04 DIAGNOSIS — N18.32 STAGE 3B CHRONIC KIDNEY DISEASE (MULTI): Primary | ICD-10-CM

## 2024-06-04 DIAGNOSIS — E21.3 HYPERPARATHYROIDISM (MULTI): ICD-10-CM

## 2024-06-04 PROCEDURE — 1159F MED LIST DOCD IN RCRD: CPT | Performed by: INTERNAL MEDICINE

## 2024-06-04 PROCEDURE — 3074F SYST BP LT 130 MM HG: CPT | Performed by: INTERNAL MEDICINE

## 2024-06-04 PROCEDURE — 3008F BODY MASS INDEX DOCD: CPT | Performed by: INTERNAL MEDICINE

## 2024-06-04 PROCEDURE — 1123F ACP DISCUSS/DSCN MKR DOCD: CPT | Performed by: INTERNAL MEDICINE

## 2024-06-04 PROCEDURE — 99213 OFFICE O/P EST LOW 20 MIN: CPT | Performed by: INTERNAL MEDICINE

## 2024-06-04 PROCEDURE — 3078F DIAST BP <80 MM HG: CPT | Performed by: INTERNAL MEDICINE

## 2024-06-04 PROCEDURE — 1036F TOBACCO NON-USER: CPT | Performed by: INTERNAL MEDICINE

## 2024-06-04 NOTE — PROGRESS NOTES
Janell Miguel   73 y.o.    @@  [facility]/Room: [id number]/Room/bed info not found    Subjective:   The patient is being seen for a routine clinic follow-up of chronic kidney disease. Recently, the disease has been stable. Disease complications:  No hyperkalemia, no hypocalcemia, no hyperphosphatemia, no metabolic acidosis, no coagulopathy, no uremic encephalopathy, no neuropathy and no renal osteodystrophy. The patient is currently asymptomatic. No associated symptoms are reported.       Meds:   Current Outpatient Medications   Medication Sig Dispense Refill    acetaminophen (Tylenol) 500 mg tablet Take 2 tablets (1,000 mg) by mouth every 8 hours if needed.      albuterol 90 mcg/actuation inhaler Inhale 2 puffs every 4 hours if needed for shortness of breath or wheezing. Every 4-6 hours as needed 18 g 11    calcitriol (Rocaltrol) 0.25 mcg capsule Take 1 capsule (0.25 mcg) by mouth every other day. 45 capsule 3    candesartan-hydrochlorothiazid (Atacand HCT) 16-12.5 mg tablet Take 1 tablet by mouth once daily. 90 tablet 3    cetirizine (ZyrTEC) 10 mg tablet Take 1 tablet (10 mg) by mouth once daily. 30 tablet 11    cholecalciferol (Vitamin D-3) 25 MCG (1000 UT) capsule Take 1 capsule (25 mcg) by mouth every other day. WITH FOOD      clindamycin (Cleocin T) 1 % gel Apply topically if needed (skin irritation / acne). 30 g 3    cyanocobalamin (Vitamin B-12) 1,000 mcg tablet Take 100 mcg by mouth 3 (three) times a week.      docusate sodium (Colace) 100 mg capsule Take 1 capsule (100 mg) by mouth 2 times a day. (Patient taking differently: Take 1 capsule (100 mg) by mouth once daily.) 60 capsule 0    famotidine (Pepcid) 20 mg tablet Take 1 tablet (20 mg) by mouth 2 times a day. 60 tablet 5    ferrous sulfate 325 (65 Fe) MG EC tablet Take 65 mg by mouth 3 times a week. Do not crush, chew, or split. prn      sucralfate (Carafate) 1 gram tablet Take 1 tablet (1 g) by mouth 4 times a day before meals. 360 tablet 3    ubrogepant  (Ubrelvy) 100 mg tablet tablet Take 1 tablet (100 mg) by mouth if needed. Use as needed for HA, may repeat x1 in 2 hours      fluticasone (Flonase) 50 mcg/actuation nasal spray ADMINISTER 2 SPRAYS INTO EACH NOSTRIL ONCE DAILY. 48 mL 1     No current facility-administered medications for this visit.          ROS:  The patient is awake and oriented. No dizziness or lightheadedness. No chills and no fever. No headaches. No nausea and no vomiting. No shortness of breath. No cough. No sputum. No chest pain. No chest tightness. No abdominal pain. No diarrhea and no constipation. No hematemesis or hemoptysis. No hematuria. No rectal bleeding. No melena. No epistaxis. No urinary symptoms. No flank pain. No leg edema. No leg pain. No weakness. No itching. Overall, the rest of the review of systems is also negative.  12 point review of systems otherwise negative as stated in HPI.        Physical Examination:        Vitals:    06/04/24 1338   BP: 129/79   Pulse: 71     General: The patient is awake, oriented, and is not in any distress.  Head and Neck: Normocephalic. No periorbital edema.  Eyes: non-icteric  Respiratory: Symmetric air entry. Symmetric chest expansion.No respiratory distress.  Cardiovascular: Symmetric peripheral pulses.  Skin: No maculopapular rash.  Abdomen: soft, nt/nd  Musculoskeletal: No peripheral edema in both left and right upper extremities.  No edema in either left or right lower extremities.  Neuro Exam: Speech is fluent. Moves extremities.    Imaging:  === 12/06/21 ===    US RENAL COMPLETE    - Impression -  Simple renal cysts. No hydronephrosis.       Blood Labs:  No results found for this or any previous visit (from the past 24 hour(s)).   Lab Results   Component Value Date    .6 (H) 02/01/2024    PROTUR NEGATIVE 01/03/2024    PHOS 3.8 02/01/2024      Lab Results   Component Value Date    GLUCOSE 71 (L) 05/30/2024    CALCIUM 9.5 05/30/2024     05/30/2024    K 4.2 05/30/2024    CO2 28  05/30/2024     05/30/2024    BUN 22 05/30/2024    CREATININE 1.27 (H) 05/30/2024         Assessment and Plan:  #1 chronic kidney disease stage III. Last creatinine level is 1.27.  Kidney function is back to her baseline.  Normal K and Bicarb level.      #2 hypertension. Blood pressure is under control. Continue the current medications.     3.  Secondary hyperparathyroidism.  On calcitriol.  PTH level will be checked before the next appointment.     I will see her in about 4 months for follow-up           Lopez Pope MD  Senior Attending Physician  Director of Onco-Nephrology Program  Division of Nephrology & Hypertension  [facility]

## 2024-07-01 ENCOUNTER — APPOINTMENT (OUTPATIENT)
Dept: PRIMARY CARE | Facility: CLINIC | Age: 74
End: 2024-07-01
Payer: COMMERCIAL

## 2024-07-02 ENCOUNTER — APPOINTMENT (OUTPATIENT)
Dept: PRIMARY CARE | Facility: CLINIC | Age: 74
End: 2024-07-02
Payer: COMMERCIAL

## 2024-07-02 VITALS
OXYGEN SATURATION: 97 % | HEART RATE: 88 BPM | TEMPERATURE: 98.6 F | HEIGHT: 65 IN | DIASTOLIC BLOOD PRESSURE: 69 MMHG | BODY MASS INDEX: 41.15 KG/M2 | WEIGHT: 247 LBS | SYSTOLIC BLOOD PRESSURE: 108 MMHG

## 2024-07-02 DIAGNOSIS — E66.01 CLASS 3 SEVERE OBESITY DUE TO EXCESS CALORIES WITH SERIOUS COMORBIDITY AND BODY MASS INDEX (BMI) OF 40.0 TO 44.9 IN ADULT (MULTI): ICD-10-CM

## 2024-07-02 DIAGNOSIS — N18.31 STAGE 3A CHRONIC KIDNEY DISEASE (MULTI): Primary | ICD-10-CM

## 2024-07-02 DIAGNOSIS — I10 ESSENTIAL HYPERTENSION, BENIGN: ICD-10-CM

## 2024-07-02 PROCEDURE — 3078F DIAST BP <80 MM HG: CPT | Performed by: INTERNAL MEDICINE

## 2024-07-02 PROCEDURE — G0447 BEHAVIOR COUNSEL OBESITY 15M: HCPCS | Performed by: INTERNAL MEDICINE

## 2024-07-02 PROCEDURE — 99214 OFFICE O/P EST MOD 30 MIN: CPT | Performed by: INTERNAL MEDICINE

## 2024-07-02 PROCEDURE — 1160F RVW MEDS BY RX/DR IN RCRD: CPT | Performed by: INTERNAL MEDICINE

## 2024-07-02 PROCEDURE — G2211 COMPLEX E/M VISIT ADD ON: HCPCS | Performed by: INTERNAL MEDICINE

## 2024-07-02 PROCEDURE — 3074F SYST BP LT 130 MM HG: CPT | Performed by: INTERNAL MEDICINE

## 2024-07-02 PROCEDURE — 1123F ACP DISCUSS/DSCN MKR DOCD: CPT | Performed by: INTERNAL MEDICINE

## 2024-07-02 PROCEDURE — 1159F MED LIST DOCD IN RCRD: CPT | Performed by: INTERNAL MEDICINE

## 2024-07-02 PROCEDURE — 1036F TOBACCO NON-USER: CPT | Performed by: INTERNAL MEDICINE

## 2024-07-02 PROCEDURE — 3008F BODY MASS INDEX DOCD: CPT | Performed by: INTERNAL MEDICINE

## 2024-07-02 PROCEDURE — 1158F ADVNC CARE PLAN TLK DOCD: CPT | Performed by: INTERNAL MEDICINE

## 2024-07-02 ASSESSMENT — PATIENT HEALTH QUESTIONNAIRE - PHQ9
1. LITTLE INTEREST OR PLEASURE IN DOING THINGS: NOT AT ALL
10. IF YOU CHECKED OFF ANY PROBLEMS, HOW DIFFICULT HAVE THESE PROBLEMS MADE IT FOR YOU TO DO YOUR WORK, TAKE CARE OF THINGS AT HOME, OR GET ALONG WITH OTHER PEOPLE: SOMEWHAT DIFFICULT
SUM OF ALL RESPONSES TO PHQ9 QUESTIONS 1 AND 2: 1
2. FEELING DOWN, DEPRESSED OR HOPELESS: SEVERAL DAYS

## 2024-07-02 NOTE — PROGRESS NOTES
CC/HPI:   Follow-up (6 month follow up ).  Saw Nephrology: (Copied)  #1 chronic kidney disease stage III. Last creatinine level is 1.27.  Kidney function is back to her baseline.  Normal K and Bicarb level.   #2 hypertension. Blood pressure is under control. Continue the current medications.  3.  Secondary hyperparathyroidism.  On calcitriol.  PTH level will be checked before the next appointment.  I will see her in about 4 months for follow-up     Met with  pharmacist in April (copied)  Assessment/Plan   Problem List Items Addressed This Visit               ICD-10-CM     CKD (chronic kidney disease) stage 3, GFR 30-59 ml/min (CMS/[facility]) - Primary N18.30       Patients CKD is stable, as evidenced by the most recent eGFR of 46mL/min/m2 on 2/15/2024.     Patients lipids are uncontrolled and may benefit from the addition of Atorvastatin 20mg daily.    Patient checks her BP at home every once in awhile, and reports well controlled.     Patient may also benefit from the addition of SGLT2 for CKD.     Continue working towards healthy diet and lifestyle.         Follow up with Clinical Pharmacy Team as needed by the patient or PCP.  Continue all meds under the continuation of care with the referring provider and clinical pharmacy team.  Please reach out to the Clinical Pharmacy Team if there are any further questions.   Verbal consent to manage patient's drug therapy was obtained from patient. They were informed they may decline to participate or withdraw from participation in pharmacy services at any time.     Tahira Alicia, PharmD  [phone]      Seeing Dr Vicki bee  For ehr hernia eval    Labs reviewed:  Component      Latest Ref Rng 2/1/2024 2/15/2024 5/30/2024   Creatinine      0.50 - 1.05 mg/dL 1.62 (H)  1.25 (H)  1.27 (H)    EGFR      >60 mL/min/1.73m*2 34 (L)  46 (L)  45 (L)      Component      Latest Ref Rng 2/1/2024   Calcium      8.6 - 10.6 mg/dL 9.5    Parathyroid Hormone, Intact      18.5 - 88.0 pg/mL 133.6  "(H)    Vitamin D, 25-Hydroxy, Total      30 - 100 ng/mL 44    PHOSPHORUS      2.5 - 4.9 mg/dL 3.8     on calcitriol    Component      Latest Ref Rng 5/30/2024   GLUCOSE      74 - 99 mg/dL 71 (L)    SODIUM      136 - 145 mmol/L 141    POTASSIUM      3.5 - 5.3 mmol/L 4.2    CHLORIDE      98 - 107 mmol/L 104    Bicarbonate      21 - 32 mmol/L 28    Anion Gap      10 - 20 mmol/L 13    Blood Urea Nitrogen      6 - 23 mg/dL 22         Assessment and Plan:  Problem List Items Addressed This Visit          Medium    CKD (chronic kidney disease) stage 3, GFR 30-59 ml/min (Multi) - Primary    Overview     On ARB  Stable since 2019  Dr. Pope q6m  3/22 Cr 1.31, GFR 44  7/23 Cr 1.24, GFR 46  S/p Pharmacy consult 4/10/24:   Patients lipids are uncontrolled and may benefit from the addition of Atorvastatin 20mg daily.  Patient may also benefit from the addition of SGLT2 for CKD.         Current Assessment & Plan     Start SGLT2 med         Relevant Medications    empagliflozin (Jardiance) 10 mg    Class 3 severe obesity due to excess calories with serious comorbidity and body mass index (BMI) of 40.0 to 44.9 in adult (Multi)    Overview     15 minutes spent discussing:  BMI was above normal measurement. Current weight: 112 kg (247 lb)  Weight change since last visit (-) denotes wt loss -7 lbs   Weight loss needed to achieve BMI 25: 97.1 Lbs  Weight loss needed to achieve BMI 30: 67.1 Lbs  walks daily  Will try SGLT2         Relevant Medications    empagliflozin (Jardiance) 10 mg    Essential hypertension, benign    Overview      Goal <130/80      On ARB/HCTZ      10-/20: ours 150/78 hr 98            hers: 139/86 hr 87      Stable      Monitor         Current Assessment & Plan     Watch BP perioperatively  Tends to drop after anesthesia/seadtion              ROS otherwise negative aside from what was mentioned above in HPI.    Vitals  /69   Pulse 88   Temp 37 °C (98.6 °F)   Ht 1.651 m (5' 5\")   Wt 112 kg (247 lb)   " SpO2 97%   BMI 41.10 kg/m²   Body mass index is 41.1 kg/m².  Physical Exam  Gen: Alert, NAD  HEENT: Unremarkable  Respiratory:  Lungs CTAB  CV: RRR  Neuro:  Gross motor and sensory intact  Ext: + DC on her palm    Allergies and Medications  Iodinated contrast media, Azelastine, Clindamycin, Codeine, Fexofenadine, Lorazepam, Prochlorperazine, Sudafed [pseudoephedrine hcl], and Amoxicillin  Current Outpatient Medications   Medication Instructions    acetaminophen (TYLENOL) 1,000 mg, oral, Every 8 hours PRN    albuterol 90 mcg/actuation inhaler 2 puffs, inhalation, Every 4 hours PRN, Every 4-6 hours as needed    calcitriol (ROCALTROL) 0.25 mcg, oral, Every other day    candesartan-hydrochlorothiazid (Atacand HCT) 16-12.5 mg tablet 1 tablet, oral, Daily    cetirizine (ZYRTEC) 10 mg, oral, Daily    cholecalciferol (VITAMIN D-3) 25 mcg, oral, Every other day, WITH FOOD    clindamycin (Cleocin T) 1 % gel Topical, As needed    cyanocobalamin (VITAMIN B-12) 100 mcg, oral, 3 times weekly    docusate sodium (COLACE) 100 mg, oral, 2 times daily    empagliflozin (JARDIANCE) 10 mg, oral, Daily    famotidine (PEPCID) 20 mg, oral, 2 times daily    ferrous sulfate 65 mg, oral, 3 times weekly, Do not crush, chew, or split. prn    fluticasone (Flonase) 50 mcg/actuation nasal spray 2 sprays, Each Nostril, Daily    sucralfate (CARAFATE) 1 g, oral, 4 times daily before meals and nightly    ubrogepant (UBRELVY) 100 mg, oral, As needed, Use as needed for HA, may repeat x1 in 2 hours

## 2024-07-02 NOTE — Clinical Note
Starting SGLT2 med Can you follow up Could be uptitrated if tolerating She may also need pt assistance for cost Sent to [facility]

## 2024-07-03 DIAGNOSIS — R06.02 SHORTNESS OF BREATH: ICD-10-CM

## 2024-07-03 RX ORDER — ALBUTEROL SULFATE 90 UG/1
AEROSOL, METERED RESPIRATORY (INHALATION)
Qty: 18 G | Refills: 11 | Status: SHIPPED | OUTPATIENT
Start: 2024-07-03

## 2024-07-11 ENCOUNTER — TELEPHONE (OUTPATIENT)
Dept: PRIMARY CARE | Facility: CLINIC | Age: 74
End: 2024-07-11
Payer: COMMERCIAL

## 2024-07-11 DIAGNOSIS — Z87.19 H/O GASTRITIS: ICD-10-CM

## 2024-07-11 DIAGNOSIS — K59.09 CHRONIC CONSTIPATION: ICD-10-CM

## 2024-07-12 ENCOUNTER — TELEPHONE (OUTPATIENT)
Dept: PRIMARY CARE | Facility: CLINIC | Age: 74
End: 2024-07-12
Payer: COMMERCIAL

## 2024-07-12 DIAGNOSIS — N18.31 STAGE 3A CHRONIC KIDNEY DISEASE (MULTI): ICD-10-CM

## 2024-07-12 DIAGNOSIS — E66.01 CLASS 3 SEVERE OBESITY DUE TO EXCESS CALORIES WITH SERIOUS COMORBIDITY AND BODY MASS INDEX (BMI) OF 40.0 TO 44.9 IN ADULT (MULTI): ICD-10-CM

## 2024-07-12 DIAGNOSIS — N18.31 STAGE 3A CHRONIC KIDNEY DISEASE (MULTI): Primary | ICD-10-CM

## 2024-07-12 PROCEDURE — RXMED WILLOW AMBULATORY MEDICATION CHARGE

## 2024-07-12 NOTE — TELEPHONE ENCOUNTER
Patient never received medication from [facility], will have pharmacy mail it to patient.

## 2024-07-12 NOTE — TELEPHONE ENCOUNTER
Spoke with patient.  Relayed the Dr. Arce and his phone number to his office.  Patient verbally understood.

## 2024-07-16 ENCOUNTER — PHARMACY VISIT (OUTPATIENT)
Dept: PHARMACY | Facility: CLINIC | Age: 74
End: 2024-07-16
Payer: MEDICARE

## 2024-07-17 ENCOUNTER — TELEPHONE (OUTPATIENT)
Dept: PRIMARY CARE | Facility: CLINIC | Age: 74
End: 2024-07-17
Payer: COMMERCIAL

## 2024-07-17 DIAGNOSIS — J06.9 UPPER RESPIRATORY TRACT INFECTION, UNSPECIFIED TYPE: Primary | ICD-10-CM

## 2024-07-17 RX ORDER — LEVALBUTEROL TARTRATE 45 UG/1
1-2 AEROSOL, METERED ORAL EVERY 6 HOURS PRN
Qty: 15 G | Refills: 11 | Status: SHIPPED | OUTPATIENT
Start: 2024-07-17 | End: 2025-07-17

## 2024-07-17 NOTE — TELEPHONE ENCOUNTER
Update allergy list with this info  We took it off her allergy list (!)  Could try xopenex instead but may have similar AE

## 2024-07-17 NOTE — TELEPHONE ENCOUNTER
Pt called in regarding her Rx for Albuterol inhaler prescribed to her on 7/3/24. Pt stated that she can not use the inhaler.she stated that when she picked up Rx the pharmacist informed her she was allergic but she took it anyway's.  It caused dizziness and heart Palpitations. Pt inquiring if something else can be sent in for her. I informed her not to take  the inhaler if she was experiencing those side effects. I informed Pt I would relay message to PCP and give her a call back.     Please advise if another medication can be used

## 2024-07-17 NOTE — TELEPHONE ENCOUNTER
Allergy list updated and med flagged for removal on her medication list. Pt would like to try xopenex. Is there a certain dosage you would  like sent in. I can forward to clinical to have them format. Pt would like medication sent to  [facility] in [place]. Pt stated that she will keep PCP updated if the new medication causes any side effects .

## 2024-07-24 ENCOUNTER — APPOINTMENT (OUTPATIENT)
Dept: PHARMACY | Facility: HOSPITAL | Age: 74
End: 2024-07-24
Payer: COMMERCIAL

## 2024-07-24 DIAGNOSIS — N18.31 STAGE 3A CHRONIC KIDNEY DISEASE (MULTI): ICD-10-CM

## 2024-07-24 PROCEDURE — RXMED WILLOW AMBULATORY MEDICATION CHARGE

## 2024-07-24 RX ORDER — DAPAGLIFLOZIN 5 MG/1
5 TABLET, FILM COATED ORAL DAILY
Qty: 30 TABLET | Refills: 0 | Status: SHIPPED | OUTPATIENT
Start: 2024-07-24 | End: 2025-08-28

## 2024-07-24 NOTE — PROGRESS NOTES
Patient ID: Janell Rivera is a 73 y.o. female who presents for Chronic Kidney Disease.    Referring Provider: Lillie Dorantes MD  PCP: Lillie Dorantes MD Last visit with PCP: 2024; Next visit with PCP: 2024      Subjective   Treatment Adherence:   Patient did take medications today.   Number of missed doses in last 7 days: 0.      Preferred pharmacy: [facility] in [place]  Can patient afford prescribed medications: Yes,      HPI  CHRONIC KIDNEY DISEASE  Stage: 3a  Last eGFR: 46mL/min/m2  Last Scr: 1.25mg/dL    Does patient follow with nephrology? Yes - Dr. Lopez Pope    Any renally adjusted medications in medication list?   Famotidine: CrCl 30-60mL/min; 20mg daily or 40mg every other day    HTN history:  HTN diagnosis: yes  Current Regimen  Candesartan/hydrochlorothiazide 16/12.5mg daily  BP Cuff at home? yes  HTN at goal? yes    HLD history:  Diagnosis? yes  Current Regimen  N/a  At goal? no  Current LDL: 133mg/dL  Current Tmg/dL    DM history:  Diagnosis? no    Objective     There were no vitals taken for this visit.   BP Readings from Last 4 Encounters:   24 108/69   24 129/79   24 96/58   24 108/73      There were no vitals filed for this visit.     Labs  Lab Results   Component Value Date    BILITOT 0.6 2024    CALCIUM 9.5 2024    CO2 28 2024     2024    CREATININE 1.27 (H) 2024    GLUCOSE 71 (L) 2024    ALKPHOS 66 2024    K 4.2 2024    PROT 7.1 2024     2024    AST 25 2024    ALT 28 2024    BUN 22 2024    ANIONGAP 13 2024    MG 2.20 2024    PHOS 3.8 2024     2022    ALBUMIN 4.1 2024    GFRF 46 (A) 2023     Lab Results   Component Value Date    TRIG 133 2024    CHOL 202 (H) 2024    LDLCALC 133 (H) 2024    HDL 42.2 2024     Lab Results   Component Value Date    HGBA1C 5.8 2019     Current Outpatient Medications    Medication Instructions    acetaminophen (TYLENOL) 1,000 mg, oral, Every 8 hours PRN    albuterol 90 mcg/actuation inhaler PLEASE SEE ATTACHED FOR DETAILED DIRECTIONS    calcitriol (ROCALTROL) 0.25 mcg, oral, Every other day    candesartan-hydrochlorothiazid (Atacand HCT) 16-12.5 mg tablet 1 tablet, oral, Daily    cetirizine (ZYRTEC) 10 mg, oral, Daily    cholecalciferol (VITAMIN D-3) 25 mcg, oral, Every other day, WITH FOOD    clindamycin (Cleocin T) 1 % gel Topical, As needed    cyanocobalamin (VITAMIN B-12) 100 mcg, oral, 3 times weekly    docusate sodium (COLACE) 100 mg, oral, 2 times daily    famotidine (PEPCID) 20 mg, oral, 2 times daily    Farxiga 5 mg, oral, Daily    ferrous sulfate 65 mg, oral, 3 times weekly, Do not crush, chew, or split. prn    fluticasone (Flonase) 50 mcg/actuation nasal spray 2 sprays, Each Nostril, Daily    Jardiance 10 mg, oral, Daily    levalbuterol (Xopenex HFA) 45 mcg/actuation inhaler 1-2 puffs, inhalation, Every 6 hours PRN    sucralfate (CARAFATE) 1 g, oral, 4 times daily before meals and nightly    ubrogepant (UBRELVY) 100 mg, oral, As needed, Use as needed for HA, may repeat x1 in 2 hours       Drug Interactions;  None at time of review    Assessment/Plan   Problem List Items Addressed This Visit             ICD-10-CM    CKD (chronic kidney disease) stage 3, GFR 30-59 ml/min (Multi) N18.30     Patients CKD is stable, as evidenced by the most recent eGFR of 46mL/min/m2 on 2/15/2024. Patient was started on Farxiga 10mg daily and states she was feeling lightheaded with the medication. Will decrease to 5mg and follow up to see how the patient is feeling.    Medication Changes:  DECREASE  Farxiga 5mg daily    Patients lipids are uncontrolled and may benefit from the addition of Atorvastatin 20mg daily.    Patient checks her BP at home every once in awhile, and reports well controlled.    Continue working towards healthy diet and lifestyle.         Relevant Medications     dapagliflozin propanediol (Farxiga) 5 mg    Other Relevant Orders    Follow Up In Clinical Pharmacy     Follow up with Clinical Pharmacy Team 8/22/2024 at 2:00PM.    Continue all meds under the continuation of care with the referring provider and clinical pharmacy team.    Please reach out to the Clinical Pharmacy Team if there are any further questions.     Verbal consent to manage patient's drug therapy was obtained from patient. They were informed they may decline to participate or withdraw from participation in pharmacy services at any time.    Tahira Alicia, PharmD  [phone]

## 2024-07-25 DIAGNOSIS — Z87.19 H/O GASTRITIS: ICD-10-CM

## 2024-07-25 RX ORDER — FAMOTIDINE 20 MG/1
20 TABLET, FILM COATED ORAL 2 TIMES DAILY
Qty: 180 TABLET | Refills: 1 | Status: SHIPPED | OUTPATIENT
Start: 2024-07-25

## 2024-07-26 ENCOUNTER — PHARMACY VISIT (OUTPATIENT)
Dept: PHARMACY | Facility: CLINIC | Age: 74
End: 2024-07-26
Payer: MEDICARE

## 2024-07-29 ENCOUNTER — TELEPHONE (OUTPATIENT)
Dept: PRIMARY CARE | Facility: CLINIC | Age: 74
End: 2024-07-29
Payer: COMMERCIAL

## 2024-07-29 NOTE — TELEPHONE ENCOUNTER
SANDIE  Received fax from [facility] regarding patients CKD and does she need to be put on a lipid lowering agent.

## 2024-08-19 NOTE — ASSESSMENT & PLAN NOTE
Patients CKD is stable, as evidenced by the most recent eGFR of 46mL/min/m2 on 2/15/2024. Patient was started on Farxiga 10mg daily and states she was feeling lightheaded with the medication. Will decrease to 5mg and follow up to see how the patient is feeling.    Medication Changes:  DECREASE  Farxiga 5mg daily    Patients lipids are uncontrolled and may benefit from the addition of Atorvastatin 20mg daily.    Patient checks her BP at home every once in awhile, and reports well controlled.    Continue working towards healthy diet and lifestyle.

## 2024-08-21 NOTE — PROGRESS NOTES
Patient ID: Janell Rivera is a 73 y.o. female who presents for Chronic Kidney Disease.    Referring Provider: Lillie Dorantes MD  PCP: Lillie Dorantes MD Last visit with PCP: 2024; Next visit with PCP: 2025    Interval Events:  - Confirms started lower dose of Farxiga 5 mg  - has only taken 3 days so far, waited until her granddaughter was more available to help her if needed  - Does state she still has a small amount of dizziness, does not worry about falls with this level of side effects and is lessened compared to previous dose; resolves within 30 minutes after dose  - Drinks plenty of water throughout the day  - States this is currently tolerable for her- advised to notify pharmacist or PCP if side effects worsen    Subjective   Treatment Adherence:   Patient did take medications today.   Number of missed doses in last 7 days: 0.      Preferred pharmacy: [facility] in [place]  Can patient afford prescribed medications: Yes,      CHRONIC KIDNEY DISEASE  Stage: 3a  Last eGFR: 45mL/min/m2  Last Scr: 1.27mg/dL  Calculated CrCl: 50 mL/min    Does patient follow with nephrology? Yes - Dr. Lopez Pope    Any renally adjusted medications in medication list?   Famotidine: CrCl 30-60mL/min; 20mg daily or 40mg every other day    HTN history:  HTN diagnosis: yes  Current Regimen  Candesartan/hydrochlorothiazide 16/12.5mg daily  BP Cuff at home? yes  HTN at goal? yes    HLD history:  Diagnosis? yes  Current Regimen  N/a  At goal? no  Current LDL: 133mg/dL  Current Tmg/dL    DM history:  Diagnosis? no    Objective     There were no vitals taken for this visit.   BP Readings from Last 4 Encounters:   24 108/69   24 129/79   24 96/58   24 108/73      There were no vitals filed for this visit.     Labs  Lab Results   Component Value Date    BILITOT 0.6 2024    CALCIUM 9.5 2024    CO2 28 2024     2024    CREATININE 1.27 (H) 2024    GLUCOSE 71 (L) 2024     ALKPHOS 66 01/03/2024    K 4.2 05/30/2024    PROT 7.1 01/03/2024     05/30/2024    AST 25 01/03/2024    ALT 28 01/03/2024    BUN 22 05/30/2024    ANIONGAP 13 05/30/2024    MG 2.20 01/03/2024    PHOS 3.8 02/01/2024     12/13/2022    ALBUMIN 4.1 01/03/2024    GFRF 46 (A) 07/25/2023     Lab Results   Component Value Date    TRIG 133 01/03/2024    CHOL 202 (H) 01/03/2024    LDLCALC 133 (H) 01/03/2024    HDL 42.2 01/03/2024     Lab Results   Component Value Date    HGBA1C 5.8 05/07/2019     Current Outpatient Medications   Medication Instructions    acetaminophen (TYLENOL) 1,000 mg, oral, Every 8 hours PRN    albuterol 90 mcg/actuation inhaler PLEASE SEE ATTACHED FOR DETAILED DIRECTIONS    calcitriol (ROCALTROL) 0.25 mcg, oral, Every other day    candesartan-hydrochlorothiazid (Atacand HCT) 16-12.5 mg tablet 1 tablet, oral, Daily    cetirizine (ZYRTEC) 10 mg, oral, Daily    cholecalciferol (VITAMIN D-3) 25 mcg, oral, Every other day, WITH FOOD    clindamycin (Cleocin T) 1 % gel Topical, As needed    cyanocobalamin (VITAMIN B-12) 100 mcg, oral, 3 times weekly    docusate sodium (COLACE) 100 mg, oral, 2 times daily    famotidine (PEPCID) 20 mg, oral, 2 times daily    Farxiga 5 mg, oral, Daily    ferrous sulfate 65 mg, oral, 3 times weekly, Do not crush, chew, or split. prn    fluticasone (Flonase) 50 mcg/actuation nasal spray 2 sprays, Each Nostril, Daily    levalbuterol (Xopenex HFA) 45 mcg/actuation inhaler 1-2 puffs, inhalation, Every 6 hours PRN    sucralfate (CARAFATE) 1 g, oral, 4 times daily before meals and nightly    ubrogepant (UBRELVY) 100 mg, oral, As needed, Use as needed for HA, may repeat x1 in 2 hours       Drug Interactions;  None at time of review    Assessment/Plan   Problem List Items Addressed This Visit             ICD-10-CM    CKD (chronic kidney disease) stage 3, GFR 30-59 ml/min (Multi) N18.30     ASSESSMENT OF CHRONIC KIDNEY DISEASE  Patient's CKD is stable  Rationale for plan:  Patient feels less dizzy on new dose of Farxiga 5 mg daily. She does not feel she is unstable or likely to fall soon after taking the medication. She has been taking for 3 days so far. Will assess BMP to ensure electrolytes are within normal limits after patient has been taking for about 3 weeks (week of 9/9/2024) and follow-up as needed after results are returned.    Medication Changes:  Continue  Farxiga 5 mg once daily    BMP (non-fasting labs) during the week of 9/9/2024.    Monitoring and Education Discussed:  Reviewed CKD lab results and benefits of good hydration  Discussed avoidance of NSAIDs, and use of Tylenol for headaches/pain  Recommended need for good control of blood pressure and blood glucose  Advised to report any changes in fluid retention, weight or decreased urinary output  Counseled patient on MOA, expectations, duration of therapy, contraindications, administration, and monitoring parameters   Answered all patient questions and concerns            Relevant Orders    Basic metabolic panel       Follow up with Clinical Pharmacy Team 8/22/2024 at 2:00PM.    Continue all meds under the continuation of care with the referring provider and clinical pharmacy team.    Please reach out to the Clinical Pharmacy Team if there are any further questions.     Verbal consent to manage patient's drug therapy was obtained from patient. They were informed they may decline to participate or withdraw from participation in pharmacy services at any time.    Gracie Schneider, PharmD  [phone]

## 2024-08-22 ENCOUNTER — APPOINTMENT (OUTPATIENT)
Dept: PHARMACY | Facility: HOSPITAL | Age: 74
End: 2024-08-22
Payer: COMMERCIAL

## 2024-08-22 DIAGNOSIS — N18.31 STAGE 3A CHRONIC KIDNEY DISEASE (MULTI): ICD-10-CM

## 2024-08-22 NOTE — ASSESSMENT & PLAN NOTE
ASSESSMENT OF CHRONIC KIDNEY DISEASE  Patient's CKD is stable  Rationale for plan: Patient feels less dizzy on new dose of Farxiga 5 mg daily. She does not feel she is unstable or likely to fall soon after taking the medication. She has been taking for 3 days so far. Will assess BMP to ensure electrolytes are within normal limits after patient has been taking for about 3 weeks (week of 9/9/2024) and follow-up as needed after results are returned.    Medication Changes:  Continue  Farxiga 5 mg once daily    BMP (non-fasting labs) during the week of 9/9/2024.    Monitoring and Education Discussed:  Reviewed CKD lab results and benefits of good hydration  Discussed avoidance of NSAIDs, and use of Tylenol for headaches/pain  Recommended need for good control of blood pressure and blood glucose  Advised to report any changes in fluid retention, weight or decreased urinary output  Counseled patient on MOA, expectations, duration of therapy, contraindications, administration, and monitoring parameters   Answered all patient questions and concerns

## 2024-09-23 ENCOUNTER — TELEPHONE (OUTPATIENT)
Dept: PHARMACY | Facility: HOSPITAL | Age: 74
End: 2024-09-23
Payer: COMMERCIAL

## 2024-09-23 NOTE — TELEPHONE ENCOUNTER
Left message for patient to remind her to get ordered labs to check Farxiga safety.    Gracie Schneider, PharmD  [phone]

## 2024-09-24 DIAGNOSIS — N18.31 STAGE 3A CHRONIC KIDNEY DISEASE (MULTI): ICD-10-CM

## 2024-09-24 PROCEDURE — RXMED WILLOW AMBULATORY MEDICATION CHARGE

## 2024-09-24 RX ORDER — DAPAGLIFLOZIN 5 MG/1
5 TABLET, FILM COATED ORAL DAILY
Qty: 30 TABLET | Refills: 0 | Status: SHIPPED | OUTPATIENT
Start: 2024-09-24 | End: 2025-10-29

## 2024-09-26 ENCOUNTER — PHARMACY VISIT (OUTPATIENT)
Dept: PHARMACY | Facility: CLINIC | Age: 74
End: 2024-09-26
Payer: MEDICARE

## 2024-10-04 ENCOUNTER — LAB (OUTPATIENT)
Dept: LAB | Facility: LAB | Age: 74
End: 2024-10-04
Payer: COMMERCIAL

## 2024-10-04 DIAGNOSIS — N18.32 STAGE 3B CHRONIC KIDNEY DISEASE (MULTI): ICD-10-CM

## 2024-10-04 DIAGNOSIS — I10 ESSENTIAL HYPERTENSION: ICD-10-CM

## 2024-10-04 DIAGNOSIS — E21.3 HYPERPARATHYROIDISM (MULTI): ICD-10-CM

## 2024-10-04 PROCEDURE — 80048 BASIC METABOLIC PNL TOTAL CA: CPT

## 2024-10-04 PROCEDURE — 83970 ASSAY OF PARATHORMONE: CPT

## 2024-10-04 PROCEDURE — 36415 COLL VENOUS BLD VENIPUNCTURE: CPT

## 2024-10-05 LAB
ANION GAP SERPL CALC-SCNC: 14 MMOL/L (ref 10–20)
BUN SERPL-MCNC: 22 MG/DL (ref 6–23)
CALCIUM SERPL-MCNC: 9.1 MG/DL (ref 8.6–10.6)
CHLORIDE SERPL-SCNC: 103 MMOL/L (ref 98–107)
CO2 SERPL-SCNC: 26 MMOL/L (ref 21–32)
CREAT SERPL-MCNC: 1.16 MG/DL (ref 0.5–1.05)
EGFRCR SERPLBLD CKD-EPI 2021: 50 ML/MIN/1.73M*2
GLUCOSE SERPL-MCNC: 80 MG/DL (ref 74–99)
POTASSIUM SERPL-SCNC: 4.1 MMOL/L (ref 3.5–5.3)
PTH-INTACT SERPL-MCNC: 62.8 PG/ML (ref 18.5–88)
SODIUM SERPL-SCNC: 139 MMOL/L (ref 136–145)

## 2024-10-08 ENCOUNTER — TELEPHONE (OUTPATIENT)
Dept: PRIMARY CARE | Facility: CLINIC | Age: 74
End: 2024-10-08

## 2024-10-08 ENCOUNTER — APPOINTMENT (OUTPATIENT)
Dept: NEPHROLOGY | Facility: CLINIC | Age: 74
End: 2024-10-08
Payer: COMMERCIAL

## 2024-10-08 VITALS
HEART RATE: 59 BPM | DIASTOLIC BLOOD PRESSURE: 73 MMHG | HEIGHT: 65 IN | BODY MASS INDEX: 42.99 KG/M2 | WEIGHT: 258 LBS | SYSTOLIC BLOOD PRESSURE: 131 MMHG

## 2024-10-08 DIAGNOSIS — N18.32 STAGE 3B CHRONIC KIDNEY DISEASE (MULTI): Primary | ICD-10-CM

## 2024-10-08 DIAGNOSIS — I10 ESSENTIAL HYPERTENSION: ICD-10-CM

## 2024-10-08 DIAGNOSIS — E21.3 HYPERPARATHYROIDISM (MULTI): ICD-10-CM

## 2024-10-08 PROCEDURE — 3075F SYST BP GE 130 - 139MM HG: CPT | Performed by: INTERNAL MEDICINE

## 2024-10-08 PROCEDURE — 1159F MED LIST DOCD IN RCRD: CPT | Performed by: INTERNAL MEDICINE

## 2024-10-08 PROCEDURE — 3008F BODY MASS INDEX DOCD: CPT | Performed by: INTERNAL MEDICINE

## 2024-10-08 PROCEDURE — 99213 OFFICE O/P EST LOW 20 MIN: CPT | Performed by: INTERNAL MEDICINE

## 2024-10-08 PROCEDURE — 3078F DIAST BP <80 MM HG: CPT | Performed by: INTERNAL MEDICINE

## 2024-10-08 PROCEDURE — 1036F TOBACCO NON-USER: CPT | Performed by: INTERNAL MEDICINE

## 2024-10-08 PROCEDURE — 1123F ACP DISCUSS/DSCN MKR DOCD: CPT | Performed by: INTERNAL MEDICINE

## 2024-10-08 NOTE — PROGRESS NOTES
Janell Rivera   73 y.o.    @@  [facility]/Room: [id number]/Room/bed info not found    Subjective:   The patient is being seen for a routine clinic follow-up of chronic kidney disease. Recently, the disease has been stable. Disease complications:  No hyperkalemia, no hypocalcemia, no hyperphosphatemia, no metabolic acidosis, no coagulopathy, no uremic encephalopathy, no neuropathy and no renal osteodystrophy. The patient is currently asymptomatic. No associated symptoms are reported.       Meds:   Current Outpatient Medications   Medication Sig Dispense Refill    acetaminophen (Tylenol) 500 mg tablet Take 2 tablets (1,000 mg) by mouth every 8 hours if needed.      albuterol 90 mcg/actuation inhaler PLEASE SEE ATTACHED FOR DETAILED DIRECTIONS 18 g 11    calcitriol (Rocaltrol) 0.25 mcg capsule Take 1 capsule (0.25 mcg) by mouth every other day. 45 capsule 3    candesartan-hydrochlorothiazid (Atacand HCT) 16-12.5 mg tablet Take 1 tablet by mouth once daily. 90 tablet 3    cetirizine (ZyrTEC) 10 mg tablet Take 1 tablet (10 mg) by mouth once daily. 30 tablet 11    cholecalciferol (Vitamin D-3) 25 MCG (1000 UT) capsule Take 1 capsule (25 mcg) by mouth every other day. WITH FOOD      clindamycin (Cleocin T) 1 % gel Apply topically if needed (skin irritation / acne). 30 g 3    cyanocobalamin (Vitamin B-12) 1,000 mcg tablet Take 100 mcg by mouth 3 (three) times a week.      dapagliflozin propanediol (Farxiga) 5 mg Take 1 tablet (5 mg) by mouth once daily. 30 tablet 0    docusate sodium (Colace) 100 mg capsule Take 1 capsule (100 mg) by mouth 2 times a day. (Patient taking differently: Take 1 capsule (100 mg) by mouth once daily.) 60 capsule 0    famotidine (Pepcid) 20 mg tablet TAKE 1 TABLET BY MOUTH TWICE A  tablet 1    ferrous sulfate 325 (65 Fe) MG EC tablet Take 65 mg by mouth 3 times a week. Do not crush, chew, or split. prn      levalbuterol (Xopenex HFA) 45 mcg/actuation inhaler Inhale 1-2 puffs every 6 hours if needed  for wheezing. 15 g 11    sucralfate (Carafate) 1 gram tablet Take 1 tablet (1 g) by mouth 4 times a day before meals. 360 tablet 3    fluticasone (Flonase) 50 mcg/actuation nasal spray ADMINISTER 2 SPRAYS INTO EACH NOSTRIL ONCE DAILY. 48 mL 1    ubrogepant (Ubrelvy) 100 mg tablet tablet Take 1 tablet (100 mg) by mouth if needed. Use as needed for HA, may repeat x1 in 2 hours       No current facility-administered medications for this visit.          ROS:  The patient is awake and oriented. No dizziness or lightheadedness. No chills and no fever. No headaches. No nausea and no vomiting. No shortness of breath. No cough. No sputum. No chest pain. No chest tightness. No abdominal pain. No diarrhea and no constipation. No hematemesis or hemoptysis. No hematuria. No rectal bleeding. No melena. No epistaxis. No urinary symptoms. No flank pain. No leg edema. No leg pain. No weakness. No itching. Overall, the rest of the review of systems is also negative.  12 point review of systems otherwise negative as stated in HPI.        Physical Examination:        Vitals:    10/08/24 1350   BP: 131/73   Pulse: 59     General: The patient is awake, oriented, and is not in any distress.  Head and Neck: Normocephalic. No periorbital edema.  Eyes: non-icteric  Respiratory: Symmetric air entry. Symmetric chest expansion.No respiratory distress.  Skin: No maculopapular rash.  Musculoskeletal: No peripheral edema in both left and right upper extremities.  No edema in either left or right lower extremities.  Neuro Exam: Speech is fluent. Moves extremities.    Imaging:  === 12/06/21 ===    US RENAL COMPLETE    - Impression -  Simple renal cysts. No hydronephrosis.       Blood Labs:  No results found for this or any previous visit (from the past 24 hour(s)).   Lab Results   Component Value Date    PTH 62.8 10/04/2024    PROTUR NEGATIVE 01/03/2024    PHOS 3.8 02/01/2024      Lab Results   Component Value Date    GLUCOSE 80 10/04/2024     CALCIUM 9.1 10/04/2024     10/04/2024    K 4.1 10/04/2024    CO2 26 10/04/2024     10/04/2024    BUN 22 10/04/2024    CREATININE 1.16 (H) 10/04/2024         Assessment and Plan:  #1 chronic kidney disease stage III. Last creatinine level is 1.16.  Kidney function is stable.  Normal K and Bicarb level.      #2 hypertension. Blood pressure is acceptable. Continue the current medications.     3.  Secondary hyperparathyroidism.  On calcitriol with good PTH level     I will see her in about 6 months for follow-up           Lopez Pope MD  Senior Attending Physician  Director of Onco-Nephrology Program  Division of Nephrology & Hypertension  [facility]

## 2024-10-17 DIAGNOSIS — N18.31 STAGE 3A CHRONIC KIDNEY DISEASE (MULTI): ICD-10-CM

## 2024-10-17 RX ORDER — DAPAGLIFLOZIN 5 MG/1
5 TABLET, FILM COATED ORAL DAILY
Qty: 30 TABLET | Refills: 11 | Status: SHIPPED | OUTPATIENT
Start: 2024-10-17 | End: 2025-11-21

## 2024-10-21 ENCOUNTER — OFFICE VISIT (OUTPATIENT)
Dept: PRIMARY CARE | Facility: CLINIC | Age: 74
End: 2024-10-21
Payer: COMMERCIAL

## 2024-10-21 VITALS
OXYGEN SATURATION: 95 % | WEIGHT: 258.8 LBS | SYSTOLIC BLOOD PRESSURE: 109 MMHG | HEART RATE: 72 BPM | TEMPERATURE: 97.6 F | DIASTOLIC BLOOD PRESSURE: 70 MMHG | BODY MASS INDEX: 43.07 KG/M2

## 2024-10-21 DIAGNOSIS — H60.502 ACUTE OTITIS EXTERNA OF LEFT EAR, UNSPECIFIED TYPE: ICD-10-CM

## 2024-10-21 DIAGNOSIS — H66.90 ACUTE OTITIS MEDIA, UNSPECIFIED OTITIS MEDIA TYPE: Primary | ICD-10-CM

## 2024-10-21 PROCEDURE — 1123F ACP DISCUSS/DSCN MKR DOCD: CPT | Performed by: NURSE PRACTITIONER

## 2024-10-21 PROCEDURE — 1159F MED LIST DOCD IN RCRD: CPT | Performed by: NURSE PRACTITIONER

## 2024-10-21 PROCEDURE — 3078F DIAST BP <80 MM HG: CPT | Performed by: NURSE PRACTITIONER

## 2024-10-21 PROCEDURE — 3074F SYST BP LT 130 MM HG: CPT | Performed by: NURSE PRACTITIONER

## 2024-10-21 PROCEDURE — 99213 OFFICE O/P EST LOW 20 MIN: CPT | Performed by: NURSE PRACTITIONER

## 2024-10-21 PROCEDURE — 1036F TOBACCO NON-USER: CPT | Performed by: NURSE PRACTITIONER

## 2024-10-21 PROCEDURE — G2211 COMPLEX E/M VISIT ADD ON: HCPCS | Performed by: NURSE PRACTITIONER

## 2024-10-21 PROCEDURE — 1160F RVW MEDS BY RX/DR IN RCRD: CPT | Performed by: NURSE PRACTITIONER

## 2024-10-21 RX ORDER — AZITHROMYCIN 250 MG/1
TABLET, FILM COATED ORAL
Qty: 6 TABLET | Refills: 0 | Status: SHIPPED | OUTPATIENT
Start: 2024-10-21 | End: 2024-10-26

## 2024-10-21 RX ORDER — NEOMYCIN SULFATE, POLYMYXIN B SULFATE, HYDROCORTISONE 3.5; 10000; 1 MG/ML; [USP'U]/ML; MG/ML
2 SOLUTION/ DROPS AURICULAR (OTIC) 4 TIMES DAILY
Qty: 10 ML | Refills: 0 | Status: SHIPPED | OUTPATIENT
Start: 2024-10-21 | End: 2024-10-28

## 2024-10-21 ASSESSMENT — PATIENT HEALTH QUESTIONNAIRE - PHQ9
2. FEELING DOWN, DEPRESSED OR HOPELESS: NOT AT ALL
SUM OF ALL RESPONSES TO PHQ9 QUESTIONS 1 AND 2: 0
1. LITTLE INTEREST OR PLEASURE IN DOING THINGS: NOT AT ALL

## 2024-10-21 NOTE — PROGRESS NOTES
Subjective   Patient ID: Janell Rivera is a 74 y.o. female who presents for Sick Visit (Post nasal drip 1-2 weeks/Left ear pain started Thursday/ Friday ).    Nasal drip for 1-2 weeks  Happens with weather change  Left ear now has sharp pain  Started polytrim in ear today  Been on claritin for awhile, but not consistent  No fever  No hearing loss  No ear discharge  No sore throat  No coughing  No wheezing  Nasal congestion  fatigued          Review of Systems  ROS completely negative except what was mentioned in the HPI.  Problem List, surgical, social, and family histories which were reviewed and updated as necessary within the EMR. I also personally reviewed the notes, labs, and imaging that pertained to what was documented or discussed in the HPI.    Objective   Physical Exam  Vitals and nursing note reviewed.   Constitutional:       General: She is not in acute distress.     Appearance: Normal appearance.   HENT:      Head: Normocephalic and atraumatic.      Right Ear: External ear normal. Tenderness present. A middle ear effusion is present. Tympanic membrane is injected.      Left Ear: External ear normal. A middle ear effusion is present.      Nose: Nose normal.      Mouth/Throat:      Mouth: Mucous membranes are moist.   Eyes:      Extraocular Movements: Extraocular movements intact.      Conjunctiva/sclera: Conjunctivae normal.      Pupils: Pupils are equal, round, and reactive to light.   Cardiovascular:      Rate and Rhythm: Normal rate and regular rhythm.      Heart sounds: Normal heart sounds.   Pulmonary:      Effort: Pulmonary effort is normal.      Breath sounds: Normal breath sounds.   Musculoskeletal:         General: Normal range of motion.      Cervical back: Normal range of motion and neck supple.   Skin:     General: Skin is warm and dry.   Neurological:      General: No focal deficit present.      Mental Status: She is alert and oriented to person, place, and time. Mental status is at baseline.    Psychiatric:         Mood and Affect: Mood normal.         Behavior: Behavior normal.         Thought Content: Thought content normal.         Judgment: Judgment normal.         /70   Pulse 72   Temp 36.4 °C (97.6 °F) (Temporal)   Wt 117 kg (258 lb 12.8 oz)   SpO2 95%   BMI 43.07 kg/m²     Assessment/Plan    Problem List Items Addressed This Visit    None  Visit Diagnoses       Acute otitis media, unspecified otitis media type    -  Primary    Relevant Medications    azithromycin (Zithromax) 250 mg tablet    Acute otitis externa of left ear, unspecified type        Relevant Medications    neomycin-polymyxin-HC (Cortisporin) otic solution

## 2024-10-21 NOTE — PATIENT INSTRUCTIONS
Flonase (fluticasone) one spray each nostril twice daily for 5-7 days, then once daily thereafter until symptoms resolve    Start azithromycin    Can start cortisporin drops if ear still sensitive to movement

## 2024-10-25 ENCOUNTER — TELEPHONE (OUTPATIENT)
Dept: PRIMARY CARE | Facility: CLINIC | Age: 74
End: 2024-10-25
Payer: COMMERCIAL

## 2024-10-25 NOTE — TELEPHONE ENCOUNTER
I left Vm for Pt early regarding her Vm about her ear being a little tender. I called Pt back and spoke with her. She is going to see how she is feeling over the weekend and reach out if not any better. If worsens she will go to the ED.

## 2024-10-25 NOTE — TELEPHONE ENCOUNTER
Pt called in and left  for [facility] NP regarding ear tenderness. Pt stated that it is still a little sore but not as bad as it was. Pt stated that she will monitor over weekend and call office Monday if not improved. I called Pt back to assess and Pt did not answer. I relayed in Vm that if not better by Monday to definitely give office a call. If worsens over weekend would recommend urgent care. If any questions to please contact office back.

## 2024-10-25 NOTE — TELEPHONE ENCOUNTER
This patient left a vm stating she was returning a missed phone call from sarah. Would any of you know about this?

## 2024-11-27 DIAGNOSIS — L70.9 ACNE, UNSPECIFIED ACNE TYPE: ICD-10-CM

## 2024-11-27 RX ORDER — CLINDAMYCIN PHOSPHATE 10 MG/G
GEL TOPICAL AS NEEDED
Qty: 30 G | Refills: 3 | Status: SHIPPED | OUTPATIENT
Start: 2024-11-27

## 2025-01-10 ENCOUNTER — LAB (OUTPATIENT)
Dept: LAB | Facility: LAB | Age: 75
End: 2025-01-10
Payer: COMMERCIAL

## 2025-01-10 DIAGNOSIS — N18.31 STAGE 3A CHRONIC KIDNEY DISEASE (MULTI): ICD-10-CM

## 2025-01-10 LAB
ANION GAP SERPL CALC-SCNC: 12 MMOL/L (ref 10–20)
BUN SERPL-MCNC: 27 MG/DL (ref 6–23)
CALCIUM SERPL-MCNC: 9.4 MG/DL (ref 8.6–10.6)
CHLORIDE SERPL-SCNC: 103 MMOL/L (ref 98–107)
CO2 SERPL-SCNC: 29 MMOL/L (ref 21–32)
CREAT SERPL-MCNC: 1.37 MG/DL (ref 0.5–1.05)
EGFRCR SERPLBLD CKD-EPI 2021: 41 ML/MIN/1.73M*2
GLUCOSE SERPL-MCNC: 81 MG/DL (ref 74–99)
POTASSIUM SERPL-SCNC: 4.3 MMOL/L (ref 3.5–5.3)
SODIUM SERPL-SCNC: 140 MMOL/L (ref 136–145)

## 2025-01-10 PROCEDURE — 80048 BASIC METABOLIC PNL TOTAL CA: CPT

## 2025-01-14 ENCOUNTER — LAB (OUTPATIENT)
Dept: LAB | Facility: LAB | Age: 75
End: 2025-01-14
Payer: COMMERCIAL

## 2025-01-14 ENCOUNTER — APPOINTMENT (OUTPATIENT)
Dept: PRIMARY CARE | Facility: CLINIC | Age: 75
End: 2025-01-14
Payer: COMMERCIAL

## 2025-01-14 VITALS
OXYGEN SATURATION: 96 % | HEIGHT: 64 IN | HEART RATE: 80 BPM | WEIGHT: 264 LBS | DIASTOLIC BLOOD PRESSURE: 82 MMHG | SYSTOLIC BLOOD PRESSURE: 122 MMHG | BODY MASS INDEX: 45.07 KG/M2

## 2025-01-14 DIAGNOSIS — I10 ESSENTIAL HYPERTENSION: ICD-10-CM

## 2025-01-14 DIAGNOSIS — E21.3 HYPERPARATHYROIDISM (MULTI): ICD-10-CM

## 2025-01-14 DIAGNOSIS — J32.1 CHRONIC FRONTAL SINUSITIS: ICD-10-CM

## 2025-01-14 DIAGNOSIS — D64.9 ANEMIA, UNSPECIFIED TYPE: ICD-10-CM

## 2025-01-14 DIAGNOSIS — Z13.39 ALCOHOL SCREENING: ICD-10-CM

## 2025-01-14 DIAGNOSIS — Z71.89 ADVANCED DIRECTIVES, COUNSELING/DISCUSSION: ICD-10-CM

## 2025-01-14 DIAGNOSIS — N18.31 STAGE 3A CHRONIC KIDNEY DISEASE (MULTI): ICD-10-CM

## 2025-01-14 DIAGNOSIS — I10 ESSENTIAL HYPERTENSION, BENIGN: ICD-10-CM

## 2025-01-14 DIAGNOSIS — F32.1 MAJOR DEPRESSIVE DISORDER, SINGLE EPISODE, MODERATE (MULTI): ICD-10-CM

## 2025-01-14 DIAGNOSIS — Z12.31 ENCOUNTER FOR SCREENING MAMMOGRAM FOR BREAST CANCER: ICD-10-CM

## 2025-01-14 DIAGNOSIS — N18.32 STAGE 3B CHRONIC KIDNEY DISEASE (MULTI): ICD-10-CM

## 2025-01-14 DIAGNOSIS — Z71.89 CARDIAC RISK COUNSELING: ICD-10-CM

## 2025-01-14 DIAGNOSIS — F41.9 ANXIETY: ICD-10-CM

## 2025-01-14 DIAGNOSIS — E55.9 VITAMIN D DEFICIENCY: ICD-10-CM

## 2025-01-14 DIAGNOSIS — K59.09 CHRONIC CONSTIPATION: ICD-10-CM

## 2025-01-14 DIAGNOSIS — K21.9 CHRONIC GERD: ICD-10-CM

## 2025-01-14 DIAGNOSIS — Z12.12 SCREENING FOR COLORECTAL CANCER: ICD-10-CM

## 2025-01-14 DIAGNOSIS — Z13.9 ENCOUNTER FOR SCREENING INVOLVING SOCIAL DETERMINANTS OF HEALTH (SDOH): ICD-10-CM

## 2025-01-14 DIAGNOSIS — E66.01 CLASS 3 SEVERE OBESITY DUE TO EXCESS CALORIES WITH SERIOUS COMORBIDITY AND BODY MASS INDEX (BMI) OF 45.0 TO 49.9 IN ADULT: ICD-10-CM

## 2025-01-14 DIAGNOSIS — Z12.11 SCREENING FOR COLORECTAL CANCER: ICD-10-CM

## 2025-01-14 DIAGNOSIS — Z13.89 SCREENING FOR MULTIPLE CONDITIONS: ICD-10-CM

## 2025-01-14 DIAGNOSIS — E66.813 CLASS 3 SEVERE OBESITY DUE TO EXCESS CALORIES WITH SERIOUS COMORBIDITY AND BODY MASS INDEX (BMI) OF 45.0 TO 49.9 IN ADULT: ICD-10-CM

## 2025-01-14 DIAGNOSIS — Z00.00 ROUTINE ADULT HEALTH MAINTENANCE: Primary | ICD-10-CM

## 2025-01-14 DIAGNOSIS — R06.83 SNORING: ICD-10-CM

## 2025-01-14 DIAGNOSIS — G47.19 EXCESSIVE DAYTIME SLEEPINESS: ICD-10-CM

## 2025-01-14 DIAGNOSIS — E53.8 B12 DEFICIENCY: ICD-10-CM

## 2025-01-14 LAB
APPEARANCE UR: CLEAR
BASOPHILS # BLD AUTO: 0.05 X10*3/UL (ref 0–0.1)
BASOPHILS NFR BLD AUTO: 0.8 %
BILIRUB UR STRIP.AUTO-MCNC: NEGATIVE MG/DL
COLOR UR: COLORLESS
CREAT UR-MCNC: 60 MG/DL (ref 20–320)
EOSINOPHIL # BLD AUTO: 0.21 X10*3/UL (ref 0–0.4)
EOSINOPHIL NFR BLD AUTO: 3.4 %
ERYTHROCYTE [DISTWIDTH] IN BLOOD BY AUTOMATED COUNT: 13 % (ref 11.5–14.5)
GLUCOSE UR STRIP.AUTO-MCNC: NORMAL MG/DL
HCT VFR BLD AUTO: 37.6 % (ref 36–46)
HGB BLD-MCNC: 12.7 G/DL (ref 12–16)
IMM GRANULOCYTES # BLD AUTO: 0.01 X10*3/UL (ref 0–0.5)
IMM GRANULOCYTES NFR BLD AUTO: 0.2 % (ref 0–0.9)
KETONES UR STRIP.AUTO-MCNC: NEGATIVE MG/DL
LEUKOCYTE ESTERASE UR QL STRIP.AUTO: NEGATIVE
LYMPHOCYTES # BLD AUTO: 2.33 X10*3/UL (ref 0.8–3)
LYMPHOCYTES NFR BLD AUTO: 37.3 %
MCH RBC QN AUTO: 28.7 PG (ref 26–34)
MCHC RBC AUTO-ENTMCNC: 33.8 G/DL (ref 32–36)
MCV RBC AUTO: 85 FL (ref 80–100)
MICROALBUMIN UR-MCNC: <7 MG/L
MICROALBUMIN/CREAT UR: NORMAL MG/G{CREAT}
MONOCYTES # BLD AUTO: 0.51 X10*3/UL (ref 0.05–0.8)
MONOCYTES NFR BLD AUTO: 8.2 %
NEUTROPHILS # BLD AUTO: 3.14 X10*3/UL (ref 1.6–5.5)
NEUTROPHILS NFR BLD AUTO: 50.1 %
NITRITE UR QL STRIP.AUTO: NEGATIVE
NRBC BLD-RTO: 0 /100 WBCS (ref 0–0)
PH UR STRIP.AUTO: 5.5 [PH]
PLATELET # BLD AUTO: 290 X10*3/UL (ref 150–450)
PROT UR STRIP.AUTO-MCNC: NEGATIVE MG/DL
RBC # BLD AUTO: 4.42 X10*6/UL (ref 4–5.2)
RBC # UR STRIP.AUTO: NEGATIVE /UL
SP GR UR STRIP.AUTO: 1.01
UROBILINOGEN UR STRIP.AUTO-MCNC: NORMAL MG/DL
WBC # BLD AUTO: 6.3 X10*3/UL (ref 4.4–11.3)

## 2025-01-14 PROCEDURE — 85025 COMPLETE CBC W/AUTO DIFF WBC: CPT

## 2025-01-14 PROCEDURE — 82306 VITAMIN D 25 HYDROXY: CPT

## 2025-01-14 PROCEDURE — 1123F ACP DISCUSS/DSCN MKR DOCD: CPT | Performed by: INTERNAL MEDICINE

## 2025-01-14 PROCEDURE — G0444 DEPRESSION SCREEN ANNUAL: HCPCS | Performed by: INTERNAL MEDICINE

## 2025-01-14 PROCEDURE — 1159F MED LIST DOCD IN RCRD: CPT | Performed by: INTERNAL MEDICINE

## 2025-01-14 PROCEDURE — G0446 INTENS BEHAVE THER CARDIO DX: HCPCS | Performed by: INTERNAL MEDICINE

## 2025-01-14 PROCEDURE — 83550 IRON BINDING TEST: CPT

## 2025-01-14 PROCEDURE — 83540 ASSAY OF IRON: CPT

## 2025-01-14 PROCEDURE — 99214 OFFICE O/P EST MOD 30 MIN: CPT | Performed by: INTERNAL MEDICINE

## 2025-01-14 PROCEDURE — 99497 ADVNCD CARE PLAN 30 MIN: CPT | Performed by: INTERNAL MEDICINE

## 2025-01-14 PROCEDURE — 82570 ASSAY OF URINE CREATININE: CPT

## 2025-01-14 PROCEDURE — 99397 PER PM REEVAL EST PAT 65+ YR: CPT | Performed by: INTERNAL MEDICINE

## 2025-01-14 PROCEDURE — 82043 UR ALBUMIN QUANTITATIVE: CPT

## 2025-01-14 PROCEDURE — 83735 ASSAY OF MAGNESIUM: CPT

## 2025-01-14 PROCEDURE — 83970 ASSAY OF PARATHORMONE: CPT

## 2025-01-14 PROCEDURE — 84443 ASSAY THYROID STIM HORMONE: CPT

## 2025-01-14 PROCEDURE — 82607 VITAMIN B-12: CPT

## 2025-01-14 PROCEDURE — 1036F TOBACCO NON-USER: CPT | Performed by: INTERNAL MEDICINE

## 2025-01-14 PROCEDURE — 80061 LIPID PANEL: CPT

## 2025-01-14 PROCEDURE — 3074F SYST BP LT 130 MM HG: CPT | Performed by: INTERNAL MEDICINE

## 2025-01-14 PROCEDURE — 3008F BODY MASS INDEX DOCD: CPT | Performed by: INTERNAL MEDICINE

## 2025-01-14 PROCEDURE — 80053 COMPREHEN METABOLIC PANEL: CPT

## 2025-01-14 PROCEDURE — 82728 ASSAY OF FERRITIN: CPT

## 2025-01-14 PROCEDURE — 3079F DIAST BP 80-89 MM HG: CPT | Performed by: INTERNAL MEDICINE

## 2025-01-14 PROCEDURE — 1160F RVW MEDS BY RX/DR IN RCRD: CPT | Performed by: INTERNAL MEDICINE

## 2025-01-14 PROCEDURE — G0439 PPPS, SUBSEQ VISIT: HCPCS | Performed by: INTERNAL MEDICINE

## 2025-01-14 PROCEDURE — 81003 URINALYSIS AUTO W/O SCOPE: CPT

## 2025-01-14 RX ORDER — SERTRALINE HYDROCHLORIDE 25 MG/1
25 TABLET, FILM COATED ORAL DAILY
Qty: 90 TABLET | Refills: 3 | Status: SHIPPED | OUTPATIENT
Start: 2025-01-14 | End: 2026-01-14

## 2025-01-14 NOTE — ASSESSMENT & PLAN NOTE
Annual Wellness exam completed   Preventive Health History reviewed  Ordered:   Labs    Mammogram   Cscope

## 2025-01-14 NOTE — PROGRESS NOTES
Annual Medicare Wellness Exam/Comprehensive Problem Focused Follow Up  HPI/CC  Chief Complaint   Patient presents with    Medicare Annual Wellness Visit Subsequent     Reviewed chart for care received since last appointment.    Went to ER in 11/24 ([facility]) dfor her sinus  Seeing ENT next week    Saw Nephrology 10/24: (copied)  #1 chronic kidney disease stage III. Last creatinine level is 1.16.  Kidney function is stable.  Normal K and Bicarb level.    #2 hypertension. Blood pressure is acceptable. Continue the current medications.   3.  Secondary hyperparathyroidism.  On calcitriol with good PTH level  I will see her in about 6 months for follow-up     Saw GI 9/24 (copied:  ASSESSMENT/PLAN:  1. Constipation, unspecified constipation type - ICD9: 564.00, ICD10: K59.00 (primary diagnosis)  - She prefers to avoid Linzess at this time and will trial consistent use of stool softener and Opti fiber daily. Encouraged at least 64 oz water and walking daily.   2. Personal history of colonic polyps - ICD9: V12.72, ICD10: Z86.010  - History of TA with HGD.              - Repeat colonoscopy due 2/2025 for surveillance.  3. Gastroesophageal reflux disease without esophagitis - ICD9: 530.81, ICD10: K21.9  - Symptoms are mainly diet dependent. Currently using PRN antacid, ?Famotidine. Prior EGD findings in 2021 unremarkable.  4. Fatty liver - ICD9: 571.8, ICD10: K76.0  - Fibroscan 3/2022 c/w S0/F1-2. Repeat rec'd 3 years. Liver enzymes are normal  RTC 2/2025 to schedule colonoscopy. Notify office if issues in the meantime.     Saw [facility] 7/24 (copied)  73 year old female s/p lap steven with pain at her epigastric port site. However on exam and review of her Ct there is no hernia present. I suspect this is just constipation and she is going to work on her bowel regimen. I'll see her back as needed.     Has gained weight  Does snore since a kid    Labs recently:  Component      Latest Ref Rng 1/10/2025   GLUCOSE      74 - 99 mg/dL 81     SODIUM      136 - 145 mmol/L 140    POTASSIUM      3.5 - 5.3 mmol/L 4.3    CHLORIDE      98 - 107 mmol/L 103    Bicarbonate      21 - 32 mmol/L 29    Anion Gap      10 - 20 mmol/L 12    Blood Urea Nitrogen      6 - 23 mg/dL 27 (H)    Creatinine      0.50 - 1.05 mg/dL 1.37 (H)    EGFR      >60 mL/min/1.73m*2 41 (L)    Calcium      8.6 - 10.6 mg/dL 9.4         Assessment and Plan:  Problem List Items Addressed This Visit          High    Routine adult health maintenance - Primary    Overview                   Moderna COVID 19 vaccine 3/8/21, 4/5/21, 11/5/21, 11/7/23      Influenza Seasonal Inj Age 3+10/10/2018, 9/30/22 , 10/24/23, 9/25/24  RSV 9/25/24      Pneumococcal-13 Vac Conjugate1/5/2017, 11/13/18       Lvmquklhi95/6/2015, 9/29/20   PCV 20 10/31/24      Shingrix 8/9/23, 10/24/23      TD Adult11/1/2005       Tdap (Age 7+)8/20/2012, 12/13/22      Cscope - 9/21 (repeat 6 mo); 2/5/22 (3yrs)      Mammogram 11/18, 12/3/19, 2/12/21, 2/16/22; 3/23;04/15/2024      BMD 12/19; 2/16/22, 5/9/24      s/p TAHBSO      Hep C Ab 10/6/15 (-)      4/22 CT abd: No AAA      12/13/22: Current 10-Year ASCVD Risk: 8.41% - Intermediate Risk         Current Assessment & Plan     Annual Wellness exam completed   Preventive Health History reviewed  Ordered:   Labs    Mammogram   Cscope              Medium    Advanced directives, counseling/discussion    Overview     1/14/25: Mick Rivera (son) will be her HCPOA. Rhoda Soto (granddaughter)  Advised to fill out her HCPOA and LW forms again  FULL CODE         Alcohol screening    Overview     5 minutes spent screening for alcohol misuse  See snapshot (social documentation) for details.           Anemia    Overview     12/22 Iron St 18% (low), likely due to decreased intake  On iron 3x/week  Also CKD (ACD)         Relevant Orders    Vitamin B12 (Completed)    Iron and TIBC (Completed)    Ferritin (Completed)    Anxiety    Overview     Insurance will no long cover Restoril  Xanax started  10/2/23  On Zoloft in past         Relevant Medications    sertraline (Zoloft) 25 mg tablet    B12 deficiency    Overview     7/5/22: 248  on supplement  monitor         Relevant Orders    Vitamin B12 (Completed)    Cardiac risk counseling    Overview     1/14/25:  Current 10-Year ASCVD Risk: 10.1% - Intermediate Risk    No ASA based on current guidelines         Chronic constipation    Overview     She doesn't tolerate Miralax.   No relief with Metamucil or stool softener.   S/p GI consult 7/24: Carafate stopped  On colace  GI recommend Linzess if symptoms worsen         Relevant Orders    TSH with reflex to Free T4 if abnormal (Completed)    Chronic GERD    Overview     Used PPI and carafate in past  Uses Pepcid prn         Chronic sinusitis    Overview     ON flonase and  zyrtec         Current Assessment & Plan     See ENT         Class 3 severe obesity due to excess calories with serious comorbidity and body mass index (BMI) of 45.0 to 49.9 in adult    Overview     Started Farxiga 2024         Current Assessment & Plan     15 minutes spent discussing:  BMI was above normal measurement. Current weight: 120 kg (264 lb)  Weight change since last visit (-) denotes wt loss 5.2 lbs   Weight loss needed to achieve BMI 25: 119.8 Lbs  Weight loss needed to achieve BMI 30: 90.9 Lbs  Consider GLP1/GIP '  HST to assess for ANTONIO         Relevant Orders    Home sleep apnea test (HSAT)    Encounter for screening involving social determinants of health (SDoH)    Overview     01/14/25: 5 minutes spent on SDOH screening.  Specifically: Housing, Food Insecurity, Utilities and Transportatin Needs were evaluated   (See Screenings in Rooming section for documentation)           Encounter for screening mammogram for breast cancer    Relevant Orders    BI mammo bilateral screening tomosynthesis (Completed)    Essential hypertension, benign    Overview      Goal <130/80      On ARB/HCTZ      10-/20: ours 150/78 hr 98            hers:  "139/86 hr 87      Stable      Monitor         Relevant Orders    Comprehensive Metabolic Panel (Completed)    CBC and Auto Differential (Completed)    Lipid Panel (Completed)    Urinalysis with Reflex Microscopic (Completed)    Albumin-Creatinine Ratio, Urine Random (Completed)    Magnesium (Completed)    Major depressive disorder, single episode, moderate (Multi)    Overview     On zoloft in past  Currently not medicated  Stable  Moitor         Current Assessment & Plan     Resume zoloft         Relevant Medications    sertraline (Zoloft) 25 mg tablet    Screening for colorectal cancer    Relevant Orders    Colonoscopy Screening; Average Risk Patient    Screening for multiple conditions    Overview     Depression screen done          Stage 3b chronic kidney disease (Multi)    Overview     On ARB  Stable since 2019  Dr. Pope q6m  3/22 Cr 1.31, GFR 44  S/p Pharmacy consult 4/10/24: Patients lipids are uncontrolled and may benefit from the addition of Atorvastatin 20mg daily.  Started Farxiga 2024         Vitamin D deficiency    Overview     Goal 30-40 (hx kidney stones)  on 50K monthly In past  3/1/24: level was 44         Relevant Orders    Vitamin D 25-Hydroxy,Total (for eval of Vitamin D levels) (Completed)     Other Visit Diagnoses       Excessive daytime sleepiness        Relevant Orders    Home sleep apnea test (HSAT)    Snoring        will r/o ANTONIO  If (+) would try to get Zepbound covered    Relevant Orders    Home sleep apnea test (HSAT)            ROS otherwise negative aside from what was mentioned above in HPI.    Vitals  /82   Pulse 80   Ht 1.619 m (5' 3.75\")   Wt 120 kg (264 lb)   SpO2 96%   BMI 45.67 kg/m²   Body mass index is 45.67 kg/m².  Physical Exam  Gen: Alert, NAD  HEENT:  Unremarkable  Neck:  No CT  Respiratory:  Lungs CTAB  Cardiovascular:  Heart RRR  Neuro:  Gross motor and sensory intact  Skin:  No suspicious lesions present  Breast: No masses, or axillary " lymphadenopathy      Patient Care Team:  Lillie Dorantes MD as PCP - General (Internal Medicine)  Lillie Dorantes MD as PCP - [facility] Medicare Advantage PCP  Lopez Pope MD as Nephrologist (Nephrology)  Gloria Duggan DPM as Referring Physician (Podiatry)  Martin Arce MD as Surgeon (Gastroenterology)       Health Risk Assessment:  Patient was asked if he/she has any difficulty performing the following activities of daily living:  Preparing nutritious food and eating? No  Grocery shopping? No  Bathing and grooming yourself? No  Getting dressed? No  Using the toilet?No  Using the phone? No  Moving around from place to place (physical ambulation)? No  Doing housework (including laundry) independently? No  Managing finances independently? No  Managing medications independently? No  Doing housework (including laundry) independently? No    Patient was asked if he/she:  Feels safe in current home environment?: Yes  Uses seatbelt? Yes  Sees the dentist regularly? Yes  Exercises regularly: Yes  Suffers from depression, stress, anger, loneliness or social isolation, pain, suicidality? No    Dietary issues discussed: Yes  Cognitive Impairment No  Hearing difficulties: No  Visual Acuity assessed: No    What is your self-assessment of overall health status and life satisfaction? Good     5 minutes spent on SDOH screening:   Specifically Housing, Food Insecurity, Utilities and Transportatin Needs were evaluated   (See Screenings in Rooming section for documentation)  Food Insecurity: Not on File (3/8/2021)    Received from [facility]    Food Insecurity     Food: 0     Housing Stability: Not on File (3/8/2021)    Received from [facility]    Housing Stability     Housin     Transportation Needs: Not on File (3/8/2021)    Received from [facility]    Transportation Needs     Transportation: 0         Allergies and Medications  Iodinated contrast media, Albuterol, Azelastine, Clindamycin, Codeine,  Fexofenadine, Jardiance [empagliflozin], Lorazepam, Prochlorperazine, Sudafed [pseudoephedrine hcl], and Amoxicillin  Current Outpatient Medications   Medication Instructions    acetaminophen (TYLENOL) 1,000 mg, Every 8 hours PRN    albuterol 90 mcg/actuation inhaler PLEASE SEE ATTACHED FOR DETAILED DIRECTIONS    calcitriol (ROCALTROL) 0.25 mcg, oral, Every other day    candesartan-hydrochlorothiazid (Atacand HCT) 16-12.5 mg tablet 1 tablet, oral, Daily    cetirizine (ZYRTEC) 10 mg, oral, Daily    cholecalciferol (VITAMIN D-3) 25 mcg, Every other day    clindamycin (Cleocin T) 1 % gel Topical, As needed    cyanocobalamin (VITAMIN B-12) 100 mcg, 3 times weekly    dapagliflozin propanediol (FARXIGA) 5 mg, oral, Daily    docusate sodium (COLACE) 100 mg, oral, 2 times daily    famotidine (PEPCID) 20 mg, oral, 2 times daily    ferrous sulfate 65 mg, 3 times weekly    fluticasone (Flonase) 50 mcg/actuation nasal spray 2 sprays, Each Nostril, Daily    levalbuterol (Xopenex HFA) 45 mcg/actuation inhaler 1-2 puffs, inhalation, Every 6 hours PRN    sertraline (ZOLOFT) 25 mg, oral, Daily       Medications and Supplements  prescribed by me and other practitioners or clinical pharmacist (such as prescriptions, OTC's, herbal therapies and supplements) were reviewed and documented in the medical record.      Active Problem List  Patient Active Problem List   Diagnosis    Acquired cyst of kidney    Kidney stones    Bladder polyp    Cholelithiasis    Chronic constipation    Chronic GERD    Stage 3b chronic kidney disease (Multi)    Diastolic dysfunction, left ventricle    Diverticulosis of colon    Fatty liver    Hepatic cyst    Grade I hemorrhoids    Hiatal hernia    Knee osteoarthritis    Lichen planus    B12 deficiency    Chronic sinusitis    Fat necrosis    Vitamin D deficiency    Anemia    Primary insomnia    Anxiety    Routine adult health maintenance    Combined forms of age-related cataract of both eyes    Essential  hypertension, benign    H/O gastritis    Eczema    Hidradenitis    History of colonic polyps    History of shingles    Hypermagnesemia    Hyperopia of both eyes with astigmatism and presbyopia    Migraine without aura and without status migrainosus, not intractable    Non-seasonal allergic rhinitis    Abnormal screening cardiac CT    Cardiac risk counseling    Menopause    Screening for multiple conditions    Major depressive disorder, single episode, moderate (Multi)    Advanced directives, counseling/discussion    Class 3 severe obesity due to excess calories with serious comorbidity and body mass index (BMI) of 45.0 to 49.9 in adult    Encounter for screening mammogram for breast cancer    Alcohol screening    Encounter for screening involving social determinants of health (SDoH)    Screening for colorectal cancer       Comprehensive Medical/Surgical/Social/Family History  Past Medical History:   Diagnosis Date    Abnormal screening cardiac CT     0/20: IMPRESSION:     1. Coronary artery calcium score of 26*. (LAD, LCX)     2. FUENTES 66th percentile** for age, gender, and race in asymptomaticpatients.     *Coronary Artery Agatston score     Score risk     Very low 1-99    H/O abdominal ultrasound     2011: 2 cm gallstone. Fatty liver with focal fatty sparring as described.    H/O bone density study     2/16/22: normal     2015 (-)     11/17: normal     12/19: normal    H/O bone density study 05/09/2024    normal    H/O CT scan of abdomen     4/22:2.8 cm left renal cyst.           Vasculature: Abdominal aorta is normal in caliber.           GI tract: No bowel obstruction. Colonic diverticulosis without acute      diverticulitis. Small hiatal hernia.    H/O CT scan of abdomen 04/2021 1/2. 29 x 26 mm rounded hypodensity posteriorly L kidney most likely cyst. Small cyst versus hemangioma superiorly segment 2 of the liver measuring 10 mm diameter. Hyperdense material along peritoneal side umbilicus. Suspect previous  umbilical herniorrhaphy. correlate with surgical history. If patient has not had previous surgery, main differential soft tissue neoplasm such as desmoid tumor.    H/O CT scan of abdomen 04/2021    2 calcified nodules in the deep subcutaneous fat anterolaterally in the right flank as     described. These could be fat necrosis or sequela from remote trauma or medicinal     injections. DJD in the spine and pelvis as described    H/O CT scan of abdomen 04/11/2024    Divertiuclosis, stomach is collapsed and suboptimally evaluated, incompletely evaluated 1cm cysts in kidneys (no contrast), no recurrent hernia, lumbar spondylosis, hip arthritis    H/O diagnostic mammography     11/5/2018: bilat digital screening mammo neg    H/O diagnostic ultrasound 05/09/2019 12/21: US kidney: RIGHT KIDNEY:Subcentimeter right renal cysts.     LEFT KIDNEY contains a slightly lobulated midpole left renal cyst measuring 26 mm     in greatest diameter.     2013 Us kidney: Bilateral complex cystic masses very similar to the CT from 12/04/2011    H/O diagnostic ultrasound     2/18: renal cysts bilateral: Simple parapelvic interpolar 1.6 x 1.1 x 1.1 cm      cyst, previously complicated. Exophytic 5 x 5 x 5 mm cyst     -Renal Lesion: Upper pole anechoic simple 3 x 2.4 x 2.6 cm cyst      (previously 2.5 x 2.3 x 2.1 cm by my measurements )    H/O upper GI x-ray series     2014: Prominent gastroesophageal reflux with change in patient's position.    Other conditions influencing health status     Diagnostic study result within normal limits    Other conditions influencing health status     History of normal mammogram    Personal history of medical treatment     2011 GES: normal     Past Surgical History:   Procedure Laterality Date    APPENDECTOMY      Appendectomy    CARPAL TUNNEL RELEASE      Carpal tunnel surgery    CATARACT EXTRACTION      Cataract surgery    CHOLECYSTECTOMY      Cholecystectomy    COLONOSCOPY  03/08/2022 2/5/22: 4mm  polyp (TA), diverticulosis     9/15/21; 36mm polyp, diverticulosis    COLONOSCOPY  2018.:One diminutive polyp in the cecum, removed with a cold               biopsy forceps. Resected and retrieved.               - One diminutive polyp in the descending colon, removed               with a cold biopsy forceps. Resected and retrieved.               - Three diminutive polyps in the sigmoid colon, removed               with a cold biopsy forceps. Resected and retrieved.    COLONOSCOPY  2018. - Moderate diverticulosis in the sigmoid colon and in the               ascending colon.               - Small non-bleeding external hemorrhoids     (5yrs)    COLONOSCOPY      : Sigmoid colon polyp (f/u 3-4 years); normal, 5years; diverticulosis, tumor     appendiceal orifice; Polyp x 2, abnormal mucosa in the ileo-cecal valve, diverticulum in     the sigmoid colon, internal hemmorhoids    CYSTOSCOPY  2019: The scope was negotiated per urethra with evidence of a benign polyps into the     bladder    ESOPHAGOGASTRODUODENOSCOPY      2021: (-)      (-)     10/4/19: Normal    HERNIA REPAIR      Hernia repair    SHOULDER ARTHROSCOPY      Shoulder surgery    TOTAL ABDOMINAL HYSTERECTOMY W/ BILATERAL SALPINGOOPHORECTOMY      Total hysterectomy with removal of both tubes and ovaries    VARICOSE VEIN SURGERY      Varicose vein ligation     Social History     Social History Narrative    Family History:        M: Breast CA, age 60s, CAD, HTN, Osteoporosis ( age 76)        F:  DM, HTN, 'Heart', DVT, COPD ( age 86)        B: Overdose ()        S: HTN        S: Hernia, HTN        D:  in  (Suicide)        (M)Aunt: Breast CA, Alzheimer's ()        (M)Aunt: Pancreatic CA ()        Social History     · Single, 2 kids        Not working     · Caffeine use      · Consumes alcohol occasionally: 6/year     · Former smoker: · Quit in 1970s,  Smoked for 7 years, <1pack/week,  quit  40 yeats ago         Tobacco/Alcohol/Opioid use, as well as Illicit Drug Use was screened for/reviewed and documented in Social Documentation section of the chart and medication list as appropriate     Depression Screening  Depression screening completed using the PHQ-2 questions, with results documented in the chart/encounter (5 min spent on this).  (See Rooming Screening section for documentation, and/or progress note for additional information)    Cardiac Risk Assessment (15 minutes spent on this)  The 10-year ASCVD risk score (Puneet GALINDO, et al., 2019) is: 13.7%    Values used to calculate the score:      Age: 74 years      Sex: Female      Is Non- : Yes      Diabetic: No      Tobacco smoker: No      Systolic Blood Pressure: 122 mmHg      Is BP treated: Yes      HDL Cholesterol: 58.1 mg/dL      Total Cholesterol: 211 mg/dL    Cardiovascular risk was discussed and, if needed, lifestyle modifications recommended, including nutritional choices, exercise, and elimination of habits contributing to risk. We agreed on a plan to reduce the current cardiovascular risk based on above discussion as needed.     Aspirin use/disuse was discussed and documented in the Problem List of the medical record (under Cardiac Risk Counseling) after reviewing the updated guidelines below:  Consider low dose Aspirin ( mg) use if the benefit for cardiovascular disease prevention outweighs risk for bleeding complications.   Discussed that in general, low dose ASA should be considered:  In patients WITHOUT prior MI/stroke/PAD (primary prevention):   a. Age <60: Use if 10-year cardiovascular disease risk >20%, with discussion of risks and benefits with patient  b. Age 60-<70: Use if 10-year cardiovascular disease risk >20% and low bleeding (e.g., gastrointestinal) risk, with discussion of risks and benefits with patient  c. Age >=70: Do not use    In patients WITH prior MI/stroke/PAD (secondary prevention):    Generally use unless extremely high bleeding (e.g., gastrointenstinal) risk, with discussion of risks and benefits with patient    Advance Directives Discussion  Advanced Care Planning (including a Living Will, Healthcare POA, as well as specific end of life choices and/or directives), was discussed with the patient and/or surrogate, voluntarily, and details of that discussion documented in the Problem List (under Advanced Directives Discussion) of the medical record.   (16 min spent discussing above)     During the course of the visit the patient was educated and counseled about age appropriate screening and preventive services.   Completed preventive screenings were documented in the chart (see Routine Health Maintenance in Problem List) and orders were placed for outstanding screenings/procedures as documented in the Assessment and Plan.    Patient Instructions (the written plan) was given to the patient at check out as part of the AVS.

## 2025-01-14 NOTE — ASSESSMENT & PLAN NOTE
15 minutes spent discussing:  BMI was above normal measurement. Current weight: 120 kg (264 lb)  Weight change since last visit (-) denotes wt loss 5.2 lbs   Weight loss needed to achieve BMI 25: 119.8 Lbs  Weight loss needed to achieve BMI 30: 90.9 Lbs  Consider GLP1/GIP '  HST to assess for ANTONIO

## 2025-01-15 LAB
25(OH)D3 SERPL-MCNC: 25 NG/ML (ref 30–100)
ALBUMIN SERPL BCP-MCNC: 4.5 G/DL (ref 3.4–5)
ALP SERPL-CCNC: 55 U/L (ref 33–136)
ALT SERPL W P-5'-P-CCNC: 21 U/L (ref 7–45)
ANION GAP SERPL CALC-SCNC: 13 MMOL/L (ref 10–20)
AST SERPL W P-5'-P-CCNC: 24 U/L (ref 9–39)
BILIRUB SERPL-MCNC: 0.7 MG/DL (ref 0–1.2)
BUN SERPL-MCNC: 20 MG/DL (ref 6–23)
CALCIUM SERPL-MCNC: 9.6 MG/DL (ref 8.6–10.3)
CHLORIDE SERPL-SCNC: 101 MMOL/L (ref 98–107)
CHOLEST SERPL-MCNC: 211 MG/DL (ref 0–199)
CHOLESTEROL/HDL RATIO: 3.6
CO2 SERPL-SCNC: 28 MMOL/L (ref 21–32)
CREAT SERPL-MCNC: 1.24 MG/DL (ref 0.5–1.05)
EGFRCR SERPLBLD CKD-EPI 2021: 46 ML/MIN/1.73M*2
FERRITIN SERPL-MCNC: 79 NG/ML (ref 8–150)
GLUCOSE SERPL-MCNC: 88 MG/DL (ref 74–99)
HDLC SERPL-MCNC: 58.1 MG/DL
IRON SATN MFR SERPL: 17 % (ref 25–45)
IRON SERPL-MCNC: 58 UG/DL (ref 35–150)
LDLC SERPL CALC-MCNC: 131 MG/DL
MAGNESIUM SERPL-MCNC: 2.28 MG/DL (ref 1.6–2.4)
NON HDL CHOLESTEROL: 153 MG/DL (ref 0–149)
POTASSIUM SERPL-SCNC: 4 MMOL/L (ref 3.5–5.3)
PROT SERPL-MCNC: 7.5 G/DL (ref 6.4–8.2)
PTH-INTACT SERPL-MCNC: 51.5 PG/ML (ref 18.5–88)
SODIUM SERPL-SCNC: 138 MMOL/L (ref 136–145)
TIBC SERPL-MCNC: 332 UG/DL (ref 240–445)
TRIGL SERPL-MCNC: 110 MG/DL (ref 0–149)
TSH SERPL-ACNC: 2.58 MIU/L (ref 0.44–3.98)
UIBC SERPL-MCNC: 274 UG/DL (ref 110–370)
VIT B12 SERPL-MCNC: 374 PG/ML (ref 211–911)
VLDL: 22 MG/DL (ref 0–40)

## 2025-01-17 DIAGNOSIS — I10 ESSENTIAL HYPERTENSION: ICD-10-CM

## 2025-01-17 DIAGNOSIS — N18.32 STAGE 3B CHRONIC KIDNEY DISEASE (MULTI): Primary | ICD-10-CM

## 2025-01-17 DIAGNOSIS — E21.3 HYPERPARATHYROIDISM (MULTI): ICD-10-CM

## 2025-01-20 DIAGNOSIS — N18.31 STAGE 3A CHRONIC KIDNEY DISEASE (MULTI): ICD-10-CM

## 2025-01-20 RX ORDER — DAPAGLIFLOZIN 5 MG/1
5 TABLET, FILM COATED ORAL DAILY
Qty: 100 TABLET | Refills: 3 | Status: SHIPPED | OUTPATIENT
Start: 2025-01-20 | End: 2025-01-23 | Stop reason: SDUPTHER

## 2025-01-21 ENCOUNTER — TELEPHONE (OUTPATIENT)
Dept: NEPHROLOGY | Facility: CLINIC | Age: 75
End: 2025-01-21
Payer: COMMERCIAL

## 2025-01-21 NOTE — TELEPHONE ENCOUNTER
----- Message from Lopez Pope sent at 2025  9:10 AM EST -----  I put order in the chart.  ----- Message -----  From: Coni Hamilton MA  Sent: 2025  11:16 AM EST  To: Lopez Pope MD    Pt advised and verbalized understanding. Pt stated the lab used your orders because Dr Dorantes's bw orders , she would like to know if any BW is needed before her .  ----- Message -----  From: Lopez Pope MD  Sent: 1/15/2025  10:59 AM EST  To: [id number] Clinical Support Staff    No major change in kidney function.

## 2025-01-23 DIAGNOSIS — N18.31 STAGE 3A CHRONIC KIDNEY DISEASE (MULTI): ICD-10-CM

## 2025-01-23 RX ORDER — DAPAGLIFLOZIN 5 MG/1
5 TABLET, FILM COATED ORAL DAILY
Qty: 100 TABLET | Refills: 3 | Status: SHIPPED | OUTPATIENT
Start: 2025-01-23 | End: 2026-02-27

## 2025-01-25 DIAGNOSIS — Z87.19 H/O GASTRITIS: ICD-10-CM

## 2025-01-26 DIAGNOSIS — E21.3 HYPERPARATHYROIDISM (MULTI): ICD-10-CM

## 2025-01-26 RX ORDER — FAMOTIDINE 20 MG/1
20 TABLET, FILM COATED ORAL 2 TIMES DAILY
Qty: 180 TABLET | Refills: 1 | Status: SHIPPED | OUTPATIENT
Start: 2025-01-26

## 2025-01-29 ENCOUNTER — TELEPHONE (OUTPATIENT)
Dept: SLEEP MEDICINE | Facility: HOSPITAL | Age: 75
End: 2025-01-29
Payer: COMMERCIAL

## 2025-01-29 RX ORDER — CALCITRIOL 0.25 UG/1
0.25 CAPSULE ORAL EVERY OTHER DAY
Qty: 45 CAPSULE | Refills: 3 | Status: SHIPPED | OUTPATIENT
Start: 2025-01-29 | End: 2026-01-29

## 2025-01-29 NOTE — TELEPHONE ENCOUNTER
Dear Dr. Dorantes:    Thank you for referring your patient to a  Sleep Testing center for their home sleep apnea test (HSAT).     Your patient recently had an inconclusive HSAT due to poor quality pulse oximeter data.     American Academy of Sleep Medicine (AASM) Guidelines typically recommend an in-center, overnight sleep test after an inconclusive attempt at an HSAT. However, we can attempt an HSAT one more time if there are special circumstances.     Given that, would you like us to re-attempt the HSAT or would you like to order an in-lab sleep study?    If you would like an in-lab sleep study, please place the order.    If you would like a repeat HSAT, no new order is needed.    Please let us know how you would like us to proceed.    One of our caregivers will reach out to schedule your patient's in-lab sleep study once the order is placed.     Kind regards,  The  Sleep Medicine Team

## 2025-01-30 DIAGNOSIS — G47.19 EXCESSIVE DAYTIME SLEEPINESS: ICD-10-CM

## 2025-01-30 NOTE — TELEPHONE ENCOUNTER
Spoke to patient and relayed message. New order was placed and phone number for Sleep lab was provided.

## 2025-02-04 PROCEDURE — RXMED WILLOW AMBULATORY MEDICATION CHARGE

## 2025-02-05 ENCOUNTER — TELEPHONE (OUTPATIENT)
Dept: PRIMARY CARE | Facility: CLINIC | Age: 75
End: 2025-02-05
Payer: COMMERCIAL

## 2025-02-05 DIAGNOSIS — I10 ESSENTIAL HYPERTENSION, BENIGN: ICD-10-CM

## 2025-02-05 DIAGNOSIS — N18.31 STAGE 3A CHRONIC KIDNEY DISEASE (MULTI): ICD-10-CM

## 2025-02-05 RX ORDER — CANDESARTAN CILEXETIL AND HYDROCHLOROTHIAZIDE 16; 12.5 MG/1; MG/1
1 TABLET ORAL DAILY
Qty: 100 TABLET | Refills: 3 | Status: SHIPPED | OUTPATIENT
Start: 2025-02-05

## 2025-02-05 NOTE — TELEPHONE ENCOUNTER
"Refill Request for candesartan hydrochlorothiazide 16-12.5 mg send to [facility]     Last OV with PCP 1/14/2025     Future Appointments       Date / Time Provider Department Dept Phone    4/1/2025 12:00 AM RAD EXTERNAL FILM EF RAD EXTERNAL FILM VIRTUAL     4/8/2025 1:50 PM Lopez Pope MD [facility] [address] [phone]    3/3/2026 2:00 PM (Arrive by 1:45 PM) Lillie Dorantes MD  [facility] [phone]          Lab Results   Component Value Date    HGBA1C 5.8 05/07/2019     Lab Results   Component Value Date    CHOL 211 (H) 01/14/2025    CHOL 202 (H) 01/03/2024    CHOL 181 12/09/2022     Lab Results   Component Value Date    HDL 58.1 01/14/2025    HDL 42.2 01/03/2024    HDL 52.5 12/09/2022     Lab Results   Component Value Date    LDLCALC 131 (H) 01/14/2025    LDLCALC 133 (H) 01/03/2024     Lab Results   Component Value Date    TRIG 110 01/14/2025    TRIG 133 01/03/2024    TRIG 81 12/09/2022     No components found for: \"CHOLHDL\"  Lab Results   Component Value Date    TSH 2.58 01/14/2025     Lab Results   Component Value Date    GLUCOSE 88 01/14/2025    CALCIUM 9.6 01/14/2025     01/14/2025    K 4.0 01/14/2025    CO2 28 01/14/2025     01/14/2025    BUN 20 01/14/2025    CREATININE 1.24 (H) 01/14/2025     "

## 2025-02-06 ENCOUNTER — PHARMACY VISIT (OUTPATIENT)
Dept: PHARMACY | Facility: CLINIC | Age: 75
End: 2025-02-06
Payer: MEDICARE

## 2025-03-24 ENCOUNTER — CLINICAL SUPPORT (OUTPATIENT)
Dept: SLEEP MEDICINE | Facility: CLINIC | Age: 75
End: 2025-03-24
Payer: COMMERCIAL

## 2025-03-24 VITALS — BODY MASS INDEX: 45.17 KG/M2 | WEIGHT: 264.55 LBS | HEIGHT: 64 IN

## 2025-03-24 DIAGNOSIS — G47.33 OBSTRUCTIVE SLEEP APNEA (ADULT) (PEDIATRIC): ICD-10-CM

## 2025-03-24 DIAGNOSIS — G47.19 EXCESSIVE DAYTIME SLEEPINESS: ICD-10-CM

## 2025-03-24 PROCEDURE — 95810 POLYSOM 6/> YRS 4/> PARAM: CPT | Performed by: SPECIALIST

## 2025-03-24 ASSESSMENT — SLEEP AND FATIGUE QUESTIONNAIRES
HOW LIKELY ARE YOU TO NOD OFF OR FALL ASLEEP WHEN YOU ARE A PASSENGER IN A CAR FOR AN HOUR WITHOUT A BREAK: WOULD NEVER DOZE
ESS-CHAD TOTAL SCORE: 0
HOW LIKELY ARE YOU TO NOD OFF OR FALL ASLEEP WHILE LYING DOWN TO REST IN THE AFTERNOON WHEN CIRCUMSTANCES PERMIT: WOULD NEVER DOZE
HOW LIKELY ARE YOU TO NOD OFF OR FALL ASLEEP IN A CAR, WHILE STOPPED FOR A FEW MINUTES IN TRAFFIC: WOULD NEVER DOZE
HOW LIKELY ARE YOU TO NOD OFF OR FALL ASLEEP WHILE WATCHING TV: WOULD NEVER DOZE
HOW LIKELY ARE YOU TO NOD OFF OR FALL ASLEEP WHILE SITTING QUIETLY AFTER LUNCH WITHOUT ALCOHOL: WOULD NEVER DOZE
HOW LIKELY ARE YOU TO NOD OFF OR FALL ASLEEP WHILE SITTING AND TALKING TO SOMEONE: WOULD NEVER DOZE
HOW LIKELY ARE YOU TO NOD OFF OR FALL ASLEEP WHILE SITTING AND READING: WOULD NEVER DOZE
SITING INACTIVE IN A PUBLIC PLACE LIKE A CLASS ROOM OR A MOVIE THEATER: WOULD NEVER DOZE

## 2025-03-25 PROBLEM — G47.33 OSA (OBSTRUCTIVE SLEEP APNEA): Status: ACTIVE | Noted: 2025-03-25

## 2025-03-25 NOTE — PROGRESS NOTES
[facility] TECH NOTE:     Patient: Janell Rivera   MRN//AGE: [id number]  1950  74 y.o.   Technologist: Dave Pgeuero   Room: 1   Service Date: 3/24/2025        Sleep Testing Location: [facility]:     TECHNOLOGIST SLEEP STUDY PROCEDURE NOTE:   This sleep study is being conducted according to the policies and procedures outlined by the AAS accreditation standards.  The sleep study procedure and processes involved during this appointment was explained to the patient/patient’s family, questions were answered. The patient/family verbalized understanding.      The patient is a 74 y.o. year old female scheduled for a Diagnostic PSG Split night with montage of:  Split . she arrived for her appointment.      The study that was ultimately completed was a Diagnostic PSG Split night with montage of:  PSG .    The full study Was completed.  Patient questionnaires completed?: yes     Consents signed? yes    Initial Fall Risk Screening:     Janell has fallen in the last 6 months. her fall did not result in injury. Janell does have a fear of falling. She does not need assistance with sitting, standing, or walking. she does not need assistance walking in her home. she does not need assistance in an unfamiliar setting. The patient is not using an assistive device.     Brief Study observations: The patient arrived for her ordered Split study of AHI >15, per lab protocol. The patient qualifies as a medium falls risk. The hook-up was done and the purpose of all sensors was explained. A shorter than normal sleep latency was seen. Once asleep, frequent moderate snoring was heard. Some respiratory events were seen, primarily while supine, but the Split criteria was not met so CPAP was not applied. All sleep stages were not seen. REM supine was not captured. The patient was observed sleeping in supine, and right side positions. Frequent trips to the restroom were noted. NSR was observed throughout the study.    Deviation to  order/protocol and reason: Patient did not meet the Split criteria      If PAP, which was preferred mask/pressure/mode: N/A      Other: None    After the procedure, the patient/family was informed to ensure followup with ordering clinician for testing results.      Technologist: Dave Peguero

## 2025-03-28 DIAGNOSIS — G47.33 OSA (OBSTRUCTIVE SLEEP APNEA): Primary | ICD-10-CM

## 2025-03-28 DIAGNOSIS — R26.89 BALANCE PROBLEM: ICD-10-CM

## 2025-04-01 ENCOUNTER — HOSPITAL ENCOUNTER (OUTPATIENT)
Dept: RADIOLOGY | Facility: EXTERNAL LOCATION | Age: 75
Discharge: HOME | End: 2025-04-01

## 2025-04-01 DIAGNOSIS — Z12.31 ENCOUNTER FOR SCREENING MAMMOGRAM FOR BREAST CANCER: ICD-10-CM

## 2025-04-03 DIAGNOSIS — G47.33 OSA (OBSTRUCTIVE SLEEP APNEA): ICD-10-CM

## 2025-04-04 LAB
ANION GAP SERPL CALCULATED.4IONS-SCNC: 9 MMOL/L (CALC) (ref 7–17)
BUN SERPL-MCNC: 24 MG/DL (ref 7–25)
BUN/CREAT SERPL: 19 (CALC) (ref 6–22)
CALCIUM SERPL-MCNC: 9.1 MG/DL (ref 8.6–10.4)
CHLORIDE SERPL-SCNC: 103 MMOL/L (ref 98–110)
CO2 SERPL-SCNC: 26 MMOL/L (ref 20–32)
CREAT SERPL-MCNC: 1.24 MG/DL (ref 0.6–1)
EGFRCR SERPLBLD CKD-EPI 2021: 46 ML/MIN/1.73M2
GLUCOSE SERPL-MCNC: 81 MG/DL (ref 65–139)
POTASSIUM SERPL-SCNC: 4.3 MMOL/L (ref 3.5–5.3)
PTH-INTACT SERPL-MCNC: 117 PG/ML (ref 16–77)
SODIUM SERPL-SCNC: 138 MMOL/L (ref 135–146)

## 2025-04-08 ENCOUNTER — TELEPHONE (OUTPATIENT)
Dept: NEPHROLOGY | Facility: CLINIC | Age: 75
End: 2025-04-08

## 2025-04-08 ENCOUNTER — APPOINTMENT (OUTPATIENT)
Dept: NEPHROLOGY | Facility: CLINIC | Age: 75
End: 2025-04-08
Payer: COMMERCIAL

## 2025-04-08 VITALS
WEIGHT: 263.4 LBS | HEART RATE: 73 BPM | DIASTOLIC BLOOD PRESSURE: 78 MMHG | SYSTOLIC BLOOD PRESSURE: 120 MMHG | BODY MASS INDEX: 45.58 KG/M2

## 2025-04-08 DIAGNOSIS — I10 ESSENTIAL HYPERTENSION: ICD-10-CM

## 2025-04-08 DIAGNOSIS — E21.3 HYPERPARATHYROIDISM (MULTI): ICD-10-CM

## 2025-04-08 DIAGNOSIS — N18.32 STAGE 3B CHRONIC KIDNEY DISEASE (MULTI): Primary | ICD-10-CM

## 2025-04-08 PROCEDURE — 3078F DIAST BP <80 MM HG: CPT | Performed by: INTERNAL MEDICINE

## 2025-04-08 PROCEDURE — 1159F MED LIST DOCD IN RCRD: CPT | Performed by: INTERNAL MEDICINE

## 2025-04-08 PROCEDURE — 1123F ACP DISCUSS/DSCN MKR DOCD: CPT | Performed by: INTERNAL MEDICINE

## 2025-04-08 PROCEDURE — 99214 OFFICE O/P EST MOD 30 MIN: CPT | Performed by: INTERNAL MEDICINE

## 2025-04-08 PROCEDURE — 3074F SYST BP LT 130 MM HG: CPT | Performed by: INTERNAL MEDICINE

## 2025-04-08 PROCEDURE — 1036F TOBACCO NON-USER: CPT | Performed by: INTERNAL MEDICINE

## 2025-04-08 NOTE — TELEPHONE ENCOUNTER
----- Message from Lopez Pope sent at 4/4/2025 10:07 AM EDT -----  No major change in kidney function.    
14-Jun-2019 19:04

## 2025-04-08 NOTE — TELEPHONE ENCOUNTER
----- Message from Lopez Pope sent at 4/7/2025  9:00 AM EDT -----  High PTH level.  I increased her calcitriol dose.

## 2025-04-08 NOTE — PROGRESS NOTES
Janellelli Rivera   74 y.o.    @@  [facility]/Room: [id number]/Room/bed info not found    Subjective:   The patient is being seen for a routine clinic follow-up of chronic kidney disease. Recently, the disease has been stable. Disease complications:  No hyperkalemia, no hypocalcemia, no hyperphosphatemia, no metabolic acidosis, no coagulopathy, no uremic encephalopathy, no neuropathy and no renal osteodystrophy. The patient is currently asymptomatic. No associated symptoms are reported.       Meds:   Current Outpatient Medications   Medication Sig Dispense Refill    acetaminophen (Tylenol) 500 mg tablet Take 2 tablets (1,000 mg) by mouth every 8 hours if needed.      albuterol 90 mcg/actuation inhaler PLEASE SEE ATTACHED FOR DETAILED DIRECTIONS 18 g 11    calcitriol (Rocaltrol) 0.25 mcg capsule Take 1 capsule (0.25 mcg) by mouth once daily. 90 capsule 3    candesartan-hydrochlorothiazid (Atacand HCT) 16-12.5 mg tablet Take 1 tablet by mouth once daily. 100 tablet 3    cetirizine (ZyrTEC) 10 mg tablet Take 1 tablet (10 mg) by mouth once daily. 30 tablet 11    cholecalciferol (Vitamin D-3) 25 MCG (1000 UT) capsule Take 1 capsule (25 mcg) by mouth every other day. WITH FOOD      clindamycin (Cleocin T) 1 % gel APPLY TOPICALLY IF NEEDED (SKIN IRRITATION / ACNE). 30 g 3    cyanocobalamin (Vitamin B-12) 1,000 mcg tablet Take 100 mcg by mouth 3 (three) times a week.      dapagliflozin propanediol (Farxiga) 5 mg Take 1 tablet (5 mg) by mouth once daily. 100 tablet 3    docusate sodium (Colace) 100 mg capsule Take 1 capsule (100 mg) by mouth 2 times a day. 60 capsule 0    famotidine (Pepcid) 20 mg tablet TAKE 1 TABLET BY MOUTH TWICE A  tablet 1    ferrous sulfate 325 (65 Fe) MG EC tablet Take 65 mg by mouth 3 times a week. Do not crush, chew, or split. prn      fluticasone (Flonase) 50 mcg/actuation nasal spray ADMINISTER 2 SPRAYS INTO EACH NOSTRIL ONCE DAILY. 48 mL 1    levalbuterol (Xopenex HFA) 45 mcg/actuation inhaler Inhale  1-2 puffs every 6 hours if needed for wheezing. 15 g 11    tirzepatide, weight loss, (Zepbound) 2.5 mg/0.5 mL injection Inject 2.5 mg under the skin every 7 days. 4 each 1     No current facility-administered medications for this visit.          ROS:  The patient is awake and oriented. No dizziness or lightheadedness. No chills and no fever. No headaches. No nausea and no vomiting. No shortness of breath. No cough. No chest pain. No abdominal pain. No diarrhea. No hematemesis or hemoptysis. No hematuria. No rectal bleeding. No melena. No epistaxis. No urinary symptoms. No flank pain. No leg edema. No itching. Overall, the rest of the review of systems is also negative.  12 point review of systems otherwise negative as stated in HPI.        Physical Examination:        Vitals:    04/08/25 1329   BP: 120/78   Pulse: 73     General: The patient is awake, oriented, and is not in any distress.  Head and Neck: Normocephalic. No periorbital edema.  Eyes: non-icteric  Respiratory: Symmetric chest expansion. No respiratory distress.  Skin: No maculopapular rash.  Musculoskeletal: No peripheral edema.  Neuro Exam: Speech is fluent. Moves extremities.    Imaging:  === 12/06/21 ===    US RENAL COMPLETE    - Impression -  Simple renal cysts. No hydronephrosis.       Blood Labs:  No results found for this or any previous visit (from the past 24 hours).   Lab Results   Component Value Date     (H) 04/03/2025    PROTUR NEGATIVE 01/14/2025    PHOS 3.8 02/01/2024      Lab Results   Component Value Date    GLUCOSE 81 04/03/2025    CALCIUM 9.1 04/03/2025     04/03/2025    K 4.3 04/03/2025    CO2 26 04/03/2025     04/03/2025    BUN 24 04/03/2025    CREATININE 1.24 (H) 04/03/2025         Assessment and Plan:  #1 chronic kidney disease stage III. Last creatinine level is 1.24.  Kidney function is stable.  Normal K and Bicarb level.      #2 hypertension. Blood pressure is under control. Continue the current  medications.     3.  Secondary hyperparathyroidism.  High PTH level. I increased her calcitriol dose.    I will see her in about 6 months for follow-up           Lopez Pope MD  Senior Attending Physician  Director of Onco-Nephrology Program  Division of Nephrology & Hypertension  [facility]

## 2025-04-10 RX ORDER — TIRZEPATIDE 2.5 MG/.5ML
2.5 INJECTION, SOLUTION SUBCUTANEOUS
Qty: 2 ML | Refills: 1 | Status: SHIPPED | OUTPATIENT
Start: 2025-04-13

## 2025-04-10 NOTE — TELEPHONE ENCOUNTER
----- Message from Gracie Schneider sent at 4/10/2025  9:43 AM EDT -----  Regarding: Med approval  Appeal for Zepbound coverage was approved

## 2025-05-14 ENCOUNTER — OFFICE VISIT (OUTPATIENT)
Dept: PRIMARY CARE | Facility: CLINIC | Age: 75
End: 2025-05-14
Payer: COMMERCIAL

## 2025-05-14 VITALS
BODY MASS INDEX: 45.24 KG/M2 | OXYGEN SATURATION: 96 % | SYSTOLIC BLOOD PRESSURE: 117 MMHG | TEMPERATURE: 97.5 F | HEART RATE: 80 BPM | DIASTOLIC BLOOD PRESSURE: 77 MMHG | WEIGHT: 265 LBS | HEIGHT: 64 IN

## 2025-05-14 DIAGNOSIS — R68.89 FLU-LIKE SYMPTOMS: ICD-10-CM

## 2025-05-14 DIAGNOSIS — E66.813 CLASS 3 SEVERE OBESITY DUE TO EXCESS CALORIES WITH SERIOUS COMORBIDITY AND BODY MASS INDEX (BMI) OF 45.0 TO 49.9 IN ADULT: ICD-10-CM

## 2025-05-14 DIAGNOSIS — J30.89 NON-SEASONAL ALLERGIC RHINITIS, UNSPECIFIED TRIGGER: ICD-10-CM

## 2025-05-14 DIAGNOSIS — G47.33 OSA (OBSTRUCTIVE SLEEP APNEA): ICD-10-CM

## 2025-05-14 DIAGNOSIS — J32.1 CHRONIC FRONTAL SINUSITIS: Primary | ICD-10-CM

## 2025-05-14 LAB
POC BINAX EXPIRATION: NORMAL
POC BINAX NOW COVID SERIAL NUMBER: NORMAL
POC RAPID INFLUENZA A: NEGATIVE
POC RAPID INFLUENZA B: NEGATIVE
POC SARS-COV-2 AG BINAX: NORMAL

## 2025-05-14 PROCEDURE — 99214 OFFICE O/P EST MOD 30 MIN: CPT | Performed by: NURSE PRACTITIONER

## 2025-05-14 PROCEDURE — 1159F MED LIST DOCD IN RCRD: CPT | Performed by: NURSE PRACTITIONER

## 2025-05-14 PROCEDURE — 3078F DIAST BP <80 MM HG: CPT | Performed by: NURSE PRACTITIONER

## 2025-05-14 PROCEDURE — G2211 COMPLEX E/M VISIT ADD ON: HCPCS | Performed by: NURSE PRACTITIONER

## 2025-05-14 PROCEDURE — 3074F SYST BP LT 130 MM HG: CPT | Performed by: NURSE PRACTITIONER

## 2025-05-14 PROCEDURE — 1036F TOBACCO NON-USER: CPT | Performed by: NURSE PRACTITIONER

## 2025-05-14 PROCEDURE — 87811 SARS-COV-2 COVID19 W/OPTIC: CPT | Performed by: NURSE PRACTITIONER

## 2025-05-14 PROCEDURE — 1160F RVW MEDS BY RX/DR IN RCRD: CPT | Performed by: NURSE PRACTITIONER

## 2025-05-14 PROCEDURE — 87804 INFLUENZA ASSAY W/OPTIC: CPT | Performed by: NURSE PRACTITIONER

## 2025-05-14 PROCEDURE — 3008F BODY MASS INDEX DOCD: CPT | Performed by: NURSE PRACTITIONER

## 2025-05-14 RX ORDER — GUAIFENESIN 600 MG/1
600-1200 TABLET, EXTENDED RELEASE ORAL 2 TIMES DAILY PRN
Qty: 40 TABLET | Refills: 1 | Status: SHIPPED | OUTPATIENT
Start: 2025-05-14 | End: 2026-05-14

## 2025-05-14 RX ORDER — CETIRIZINE HYDROCHLORIDE 10 MG/1
10 TABLET ORAL NIGHTLY
Qty: 30 TABLET | Refills: 11 | Status: SHIPPED | OUTPATIENT
Start: 2025-05-14 | End: 2026-05-14

## 2025-05-14 RX ORDER — CEFDINIR 300 MG/1
300 CAPSULE ORAL 2 TIMES DAILY
Qty: 14 CAPSULE | Refills: 0 | Status: SHIPPED | OUTPATIENT
Start: 2025-05-14 | End: 2025-05-21

## 2025-05-14 NOTE — PATIENT INSTRUCTIONS
When you have zepbound, schedule a nurse visit with us to help you self administration    For CPAP machine, I sent to [facility] [phone]

## 2025-05-14 NOTE — ASSESSMENT & PLAN NOTE
Has rx for zepbound  Needs help with administration, has not started yet  Will have her make a nurse visit and we can do education on self administration

## 2025-05-14 NOTE — PROGRESS NOTES
Subjective   Patient ID: Janell Rivera is a 74 y.o. female who presents for Earache.    Right ear pain   Headaches   Using liquid abx ear drops   Sx started last weekend   Feels drainage   Possibly sinus related  No fever    Fatigued  Body aches  Lack of appetite  Is seeing [facility] next month          Review of Systems  ROS completely negative except what was mentioned in the HPI.  Problem List, surgical, social, and family histories which were reviewed and updated as necessary within the EMR. I also personally reviewed the notes, labs, and imaging that pertained to what was documented or discussed in the HPI.    Objective   Physical Exam  Vitals and nursing note reviewed.   Constitutional:       General: She is not in acute distress.     Appearance: Normal appearance.   HENT:      Head: Normocephalic and atraumatic.      Right Ear: Tympanic membrane, ear canal and external ear normal.      Left Ear: Tympanic membrane, ear canal and external ear normal.      Nose: Nose normal.      Comments: Moderate tenderness to cheek bones on palpation     Mouth/Throat:      Mouth: Mucous membranes are moist.      Pharynx: Oropharynx is clear.   Eyes:      Extraocular Movements: Extraocular movements intact.      Conjunctiva/sclera: Conjunctivae normal.      Pupils: Pupils are equal, round, and reactive to light.   Cardiovascular:      Rate and Rhythm: Normal rate and regular rhythm.      Pulses: Normal pulses.      Heart sounds: Normal heart sounds.   Pulmonary:      Effort: Pulmonary effort is normal.      Breath sounds: Normal breath sounds.   Musculoskeletal:         General: Normal range of motion.      Cervical back: Normal range of motion and neck supple.   Lymphadenopathy:      Cervical: No cervical adenopathy.   Skin:     General: Skin is warm and dry.   Neurological:      General: No focal deficit present.      Mental Status: She is alert and oriented to person, place, and time. Mental status is at baseline.  "  Psychiatric:         Mood and Affect: Mood normal.         Behavior: Behavior normal.         Thought Content: Thought content normal.         Judgment: Judgment normal.         /77   Pulse 80   Temp 36.4 °C (97.5 °F) (Temporal)   Ht 1.619 m (5' 3.75\")   Wt 120 kg (265 lb)   SpO2 96%   BMI 45.84 kg/m²     Assessment/Plan    Problem List Items Addressed This Visit       Chronic sinusitis - Primary    Overview   ON flonase and  zyrtec         Relevant Medications    cefdinir (Omnicef) 300 mg capsule    guaiFENesin (Mucinex) 600 mg 12 hr tablet    Class 3 severe obesity due to excess calories with serious comorbidity and body mass index (BMI) of 45.0 to 49.9 in adult    Overview   Started Farxiga 2024         Current Assessment & Plan   Has rx for zepbound  Needs help with administration, has not started yet  Will have her make a nurse visit and we can do education on self administration         Non-seasonal allergic rhinitis    Overview   Uses zyrtec and flonase prn          Relevant Medications    cetirizine (ZyrTEC) 10 mg tablet    ANTONIO (obstructive sleep apnea)    Overview   3/25/25: PSG: severe obstructive sleep apnea.  The Sp02 erendira was 81.0%.   Based on the CMS definition, the sleep study is consistent with a diagnosis of moderate obstructive sleep apnea.   Respiratory events were worse in REM  sleep.   The Sp02 erendira was 81.0%.   RECOMMENDATIONS   If PAP therapy will be pursued, consider empiric initiation of auto-adjusting   CPAP with settings of 5-15 cm H2O          Current Assessment & Plan   Resent PAP scripts - to [facility]  Gave her number to call if she does not hear from them         Relevant Orders    Positive Airway Pressure (PAP) Therapy     Other Visit Diagnoses         Flu-like symptoms        Relevant Orders    POCT Influenza A/B manually resulted (Completed)    POCT BinaxNOW Covid-19 Ag Card manually resulted (Completed)           "

## 2025-05-15 ENCOUNTER — APPOINTMENT (OUTPATIENT)
Dept: PRIMARY CARE | Facility: CLINIC | Age: 75
End: 2025-05-15
Payer: COMMERCIAL

## 2025-05-21 ENCOUNTER — CLINICAL SUPPORT (OUTPATIENT)
Dept: PRIMARY CARE | Facility: CLINIC | Age: 75
End: 2025-05-21
Payer: COMMERCIAL

## 2025-06-19 DIAGNOSIS — N18.31 STAGE 3A CHRONIC KIDNEY DISEASE (MULTI): ICD-10-CM

## 2025-06-19 DIAGNOSIS — I10 ESSENTIAL HYPERTENSION, BENIGN: ICD-10-CM

## 2025-06-19 RX ORDER — CANDESARTAN CILEXETIL AND HYDROCHLOROTHIAZIDE 16; 12.5 MG/1; MG/1
1 TABLET ORAL DAILY
Qty: 90 TABLET | Refills: 3 | Status: SHIPPED | OUTPATIENT
Start: 2025-06-19

## 2025-07-30 ENCOUNTER — OFFICE VISIT (OUTPATIENT)
Dept: PRIMARY CARE | Facility: CLINIC | Age: 75
End: 2025-07-30
Payer: COMMERCIAL

## 2025-07-30 ENCOUNTER — TELEPHONE (OUTPATIENT)
Dept: PRIMARY CARE | Facility: CLINIC | Age: 75
End: 2025-07-30

## 2025-07-30 VITALS
DIASTOLIC BLOOD PRESSURE: 66 MMHG | OXYGEN SATURATION: 94 % | SYSTOLIC BLOOD PRESSURE: 102 MMHG | BODY MASS INDEX: 44.08 KG/M2 | HEIGHT: 64 IN | TEMPERATURE: 97.8 F | HEART RATE: 69 BPM | WEIGHT: 258.2 LBS

## 2025-07-30 DIAGNOSIS — H92.01 OTALGIA, RIGHT: Primary | ICD-10-CM

## 2025-07-30 PROCEDURE — 3008F BODY MASS INDEX DOCD: CPT | Performed by: NURSE PRACTITIONER

## 2025-07-30 PROCEDURE — 3074F SYST BP LT 130 MM HG: CPT | Performed by: NURSE PRACTITIONER

## 2025-07-30 PROCEDURE — 1159F MED LIST DOCD IN RCRD: CPT | Performed by: NURSE PRACTITIONER

## 2025-07-30 PROCEDURE — 99213 OFFICE O/P EST LOW 20 MIN: CPT | Performed by: NURSE PRACTITIONER

## 2025-07-30 PROCEDURE — 1160F RVW MEDS BY RX/DR IN RCRD: CPT | Performed by: NURSE PRACTITIONER

## 2025-07-30 PROCEDURE — 3078F DIAST BP <80 MM HG: CPT | Performed by: NURSE PRACTITIONER

## 2025-07-30 PROCEDURE — G2211 COMPLEX E/M VISIT ADD ON: HCPCS | Performed by: NURSE PRACTITIONER

## 2025-07-30 RX ORDER — CIPROFLOXACIN AND DEXAMETHASONE 3; 1 MG/ML; MG/ML
4 SUSPENSION/ DROPS AURICULAR (OTIC) 2 TIMES DAILY
Qty: 7.5 ML | Refills: 0 | Status: SHIPPED | OUTPATIENT
Start: 2025-07-30 | End: 2025-08-06

## 2025-07-30 NOTE — TELEPHONE ENCOUNTER
Left vm for pt to call office let us know which [facility] she has been calling for her cpap supplies so we can call and see what needs to be done to get her the supplies she needs

## 2025-07-30 NOTE — PROGRESS NOTES
"Problem List Items Addressed This Visit    None  Visit Diagnoses         Otalgia, right    -  Primary    tiny clear effusion R  TM NL  suspect ETD  flonase, ciprodex  prn fu    Relevant Medications    ciprofloxacin-dexamethasone (CiproDEX) otic suspension             Subjective   Patient ID: Janell Rivera is a 74 y.o. female who presents for right ear pain (Right ear pain x 3 days gets worse at night thinks it is from sinuses gland below ear is tender  has nasal congestion).  HPI  Tender during the day  Inside is painful at night  Throat is not sore  Cerv lymph is tender  No drainage or blood from ear  No cough  No fever  Used flonase yesterday    Review of Systems   All other systems reviewed and are negative.      BP Readings from Last 3 Encounters:   07/30/25 102/66   05/14/25 117/77   04/08/25 120/78      Wt Readings from Last 3 Encounters:   07/30/25 117 kg (258 lb 3.2 oz)   05/14/25 120 kg (265 lb)   04/08/25 119 kg (263 lb 6.4 oz)      BMI:   Estimated body mass index is 44.67 kg/m² as calculated from the following:    Height as of this encounter: 1.619 m (5' 3.75\").    Weight as of this encounter: 117 kg (258 lb 3.2 oz).    Objective   Physical Exam  Constitutional:       General: She is not in acute distress.  HENT:      Head: Normocephalic and atraumatic.      Right Ear: Tympanic membrane and external ear normal.      Left Ear: Tympanic membrane and external ear normal.      Ears:      Comments: Tiny clear effusion R     Nose: Nose normal.      Mouth/Throat:      Mouth: Mucous membranes are moist.     Eyes:      Extraocular Movements: Extraocular movements intact.      Conjunctiva/sclera: Conjunctivae normal.       Cardiovascular:      Rate and Rhythm: Normal rate and regular rhythm.      Pulses: Normal pulses.   Pulmonary:      Effort: Pulmonary effort is normal.      Breath sounds: Normal breath sounds.   Abdominal:      General: Bowel sounds are normal.     Musculoskeletal:         General: Normal range " of motion.      Cervical back: Normal range of motion and neck supple.   Lymphadenopathy:      Cervical: No cervical adenopathy.     Skin:     General: Skin is warm and dry.     Neurological:      General: No focal deficit present.      Mental Status: She is alert.     Psychiatric:         Mood and Affect: Mood normal.

## 2025-08-04 ENCOUNTER — TELEPHONE (OUTPATIENT)
Dept: PRIMARY CARE | Facility: CLINIC | Age: 75
End: 2025-08-04
Payer: COMMERCIAL

## 2025-08-04 DIAGNOSIS — H66.90 ACUTE OTITIS MEDIA, UNSPECIFIED OTITIS MEDIA TYPE: ICD-10-CM

## 2025-08-04 RX ORDER — CEFDINIR 300 MG/1
300 CAPSULE ORAL 2 TIMES DAILY
Qty: 14 CAPSULE | Refills: 0 | Status: SHIPPED | OUTPATIENT
Start: 2025-08-04 | End: 2025-08-11

## 2025-08-04 NOTE — TELEPHONE ENCOUNTER
Patient left VM saw  for an ear infection on 7/30/2025. DX otalgia,right treatment flonase and ciprofloxacin drops.  Patient stated she is still having ear pain and would like if an antibiotic can be sent in to [facility].

## 2025-08-04 NOTE — TELEPHONE ENCOUNTER
Sure that is very similar and if tolerated it let's do it  Ok to format  Add to allergy list on PCN that tolerates cephalosporins in the comments

## 2025-08-04 NOTE — TELEPHONE ENCOUNTER
I called and spoke with Pt . She is willing to try that Abx but she stated that 11/2024 ( ear infection) [facility] prescribed -    cefdinir (OMNICEF) 300 mg capsule   Take 1 capsule by mouth two times a day for 7 days.  14 capsule    11/09/2024-11/16/2024    She is inquiring if that should be sent in , she tolerated well.    Please advise    She would like Rx to go to [facility] in [place]

## 2025-08-04 NOTE — TELEPHONE ENCOUNTER
Allergy list updated      Clinical please format -  cefdinir (OMNICEF) 300 mg capsule   Take 1 capsule by mouth two times a day for 7 days.  14 capsule , [facility]

## 2025-08-04 NOTE — TELEPHONE ENCOUNTER
Can she take ceftin?  She has rash listed for Amox (5% chance for same AE with this) and GI AE with Clindamycin  If  can take cephalosporins (kefelx, etc) can send Ceftin 250mg BID for 7 days

## 2025-08-11 ENCOUNTER — APPOINTMENT (OUTPATIENT)
Dept: PRIMARY CARE | Facility: CLINIC | Age: 75
End: 2025-08-11
Payer: COMMERCIAL

## 2025-09-22 ENCOUNTER — APPOINTMENT (OUTPATIENT)
Dept: PRIMARY CARE | Facility: CLINIC | Age: 75
End: 2025-09-22
Payer: COMMERCIAL

## 2025-10-14 ENCOUNTER — APPOINTMENT (OUTPATIENT)
Dept: NEPHROLOGY | Facility: CLINIC | Age: 75
End: 2025-10-14
Payer: COMMERCIAL

## 2026-02-09 ENCOUNTER — APPOINTMENT (OUTPATIENT)
Dept: PRIMARY CARE | Facility: CLINIC | Age: 76
End: 2026-02-09
Payer: COMMERCIAL

## 2026-03-03 ENCOUNTER — APPOINTMENT (OUTPATIENT)
Dept: PRIMARY CARE | Facility: CLINIC | Age: 76
End: 2026-03-03
Payer: COMMERCIAL